# Patient Record
Sex: FEMALE | Race: BLACK OR AFRICAN AMERICAN | NOT HISPANIC OR LATINO | URBAN - METROPOLITAN AREA
[De-identification: names, ages, dates, MRNs, and addresses within clinical notes are randomized per-mention and may not be internally consistent; named-entity substitution may affect disease eponyms.]

---

## 2017-01-12 ENCOUNTER — TRANSFERRED RECORDS (OUTPATIENT)
Dept: HEALTH INFORMATION MANAGEMENT | Facility: CLINIC | Age: 32
End: 2017-01-12

## 2017-02-01 DIAGNOSIS — J45.40 MODERATE PERSISTENT ASTHMA WITHOUT COMPLICATION: Primary | ICD-10-CM

## 2017-02-14 DIAGNOSIS — I10 ESSENTIAL HYPERTENSION WITH GOAL BLOOD PRESSURE LESS THAN 140/90: ICD-10-CM

## 2017-02-14 RX ORDER — AMLODIPINE BESYLATE 10 MG/1
10 TABLET ORAL DAILY
Qty: 30 TABLET | Refills: 3 | Status: SHIPPED
Start: 2017-02-14 | End: 2017-03-20

## 2017-02-14 RX ORDER — LISINOPRIL 40 MG/1
40 TABLET ORAL DAILY
Qty: 30 TABLET | Refills: 3 | Status: SHIPPED
Start: 2017-02-14 | End: 2017-05-15

## 2017-02-15 DIAGNOSIS — I10 BENIGN ESSENTIAL HYPERTENSION: ICD-10-CM

## 2017-02-15 RX ORDER — CARVEDILOL 6.25 MG/1
6.25 TABLET ORAL 2 TIMES DAILY WITH MEALS
Qty: 60 TABLET | Refills: 3 | Status: SHIPPED
Start: 2017-02-15 | End: 2017-03-20

## 2017-02-17 ENCOUNTER — TRANSFERRED RECORDS (OUTPATIENT)
Dept: HEALTH INFORMATION MANAGEMENT | Facility: CLINIC | Age: 32
End: 2017-02-17

## 2017-02-22 ENCOUNTER — TELEPHONE (OUTPATIENT)
Dept: FAMILY MEDICINE | Facility: CLINIC | Age: 32
End: 2017-02-22

## 2017-02-23 ASSESSMENT — ASTHMA QUESTIONNAIRES: ACT_TOTALSCORE: 12

## 2017-03-17 ENCOUNTER — TRANSFERRED RECORDS (OUTPATIENT)
Dept: HEALTH INFORMATION MANAGEMENT | Facility: CLINIC | Age: 32
End: 2017-03-17

## 2017-03-17 ENCOUNTER — TELEPHONE (OUTPATIENT)
Dept: FAMILY MEDICINE | Facility: CLINIC | Age: 32
End: 2017-03-17

## 2017-03-17 ENCOUNTER — OFFICE VISIT (OUTPATIENT)
Dept: FAMILY MEDICINE | Facility: CLINIC | Age: 32
End: 2017-03-17

## 2017-03-17 VITALS
RESPIRATION RATE: 28 BRPM | WEIGHT: 153 LBS | TEMPERATURE: 98 F | OXYGEN SATURATION: 96 % | SYSTOLIC BLOOD PRESSURE: 205 MMHG | BODY MASS INDEX: 28.16 KG/M2 | HEIGHT: 62 IN | DIASTOLIC BLOOD PRESSURE: 131 MMHG | HEART RATE: 123 BPM

## 2017-03-17 DIAGNOSIS — I16.0 ASYMPTOMATIC HYPERTENSIVE URGENCY: ICD-10-CM

## 2017-03-17 DIAGNOSIS — R06.03 ACUTE RESPIRATORY DISTRESS: Primary | ICD-10-CM

## 2017-03-17 DIAGNOSIS — Z30.42 DEPO-PROVERA CONTRACEPTIVE STATUS: ICD-10-CM

## 2017-03-17 RX ORDER — ALBUTEROL SULFATE 90 UG/1
AEROSOL, METERED RESPIRATORY (INHALATION)
Qty: 1 INHALER | Refills: 5 | Status: CANCELLED | OUTPATIENT
Start: 2017-03-17

## 2017-03-17 RX ORDER — AMLODIPINE BESYLATE 10 MG/1
10 TABLET ORAL DAILY
Qty: 30 TABLET | Refills: 3 | Status: CANCELLED | OUTPATIENT
Start: 2017-03-17

## 2017-03-17 RX ORDER — ALBUTEROL SULFATE 0.83 MG/ML
1 SOLUTION RESPIRATORY (INHALATION) EVERY 6 HOURS PRN
Qty: 360 ML | Refills: 3 | Status: CANCELLED | OUTPATIENT
Start: 2017-03-17

## 2017-03-17 RX ORDER — LISINOPRIL 40 MG/1
40 TABLET ORAL DAILY
Qty: 30 TABLET | Refills: 3 | Status: CANCELLED | OUTPATIENT
Start: 2017-03-17

## 2017-03-17 NOTE — NURSING NOTE
The following nebulizer treatment was given:     MEDICATION: Duoneb  : [employer]  LOT #: [id number]  EXPIRATION DATE:  08/2018   NDC # 4736-1914-64    Clarisse Mena and Dr Nair

## 2017-03-17 NOTE — PROGRESS NOTES
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 Reports being seen in ER in [place] on 3/13, then hospitalized at [facility] for 2 nights for asthma. Careeverywhere reviewed -- appears patient was in ED 3/14 and hospitalized for 1 day when she was discharged due to needing to care for her children.    Reports ongoing shortness of breath for 1 month, off and on. Was worsening in past 1-2 weeks, to the point she couldn't breath with coughing and wheezing \"really bad\" prompting ED/hospital visit. Reports no improvement with hospital stay and since discharge. Currently, is on day 3 of medrol dose pack as well as using albuterol nebulizers and inhaler and dulera every 2-3 hours. Notes pain in left chest with breathing. Fevers off and on. Vomited once today, states she was able to keep down her meds after this now. Has HA every time she coughs. SOB at rest. Notes she can't sleep due to cough/shortness of breath -- requesting medicine to help with sleep. States that she was told her breathing was NOT due to asthma and was related to cerebrovascular disease.    Currently taking for medications (as prescribed on discharge):  -currently on day 3 of medrol dose pack  -oxycodone for pain in arthritis in back and herniated disk in back, prescribed through pain clinic  -ondansetron 4 mg but not taking as not taking as pills not ODT  -lisinopril 40 mg daily, kept down  -chantix 0.5 mg  -used albuterol nebulizer today -- 3 today, help for 10 - 15 minutes  -dulera 200 mcg/5mcg at least 6 times today --  from 2016 upon further review    Was on CoReg and amlodipine but not taking. Unfortunately, she continues to smoke.     Requesting Depo today.     Here with a friend today.         PMHX:     Patient Active Problem List   Diagnosis     Moyamoya disease     Tobacco use disorder     Migraine     Lumbago     Vitamin D " deficiency     History of angina pectoris     Health Care Home     Lump or mass in breast     Lesion of lower extremity     Possible secondary hypertension     Severe persistent asthma     Partial seizures (H)     Weakness of left side of body     Major depressive disorder, recurrent, severe without psychotic features (H)     Depo-Provera contraceptive status       Current Outpatient Prescriptions   Medication Sig Dispense Refill     lisinopril (PRINIVIL/ZESTRIL) 40 MG tablet Take 1 tablet (40 mg) by mouth daily 30 tablet 3     mometasone-formoterol (DULERA) 200-5 MCG/ACT oral inhaler Inhale 2 puffs into the lungs 2 times daily 13 Inhaler 11     medroxyPROGESTERone (DEPO-PROVERA) 150 MG/ML injection Inject 1 mL (150 mg) into the muscle every 3 months for 21 days 1 mL 0     albuterol (ALBUTEROL) 108 (90 BASE) MCG/ACT inhaler 1-2 puffs Every 4-6 hours as needed for shortness of breath. Max 12 puffs/ day. 1 Inhaler 5     oxyCODONE (ROXICODONE) 10 MG immediate release tablet Take 10 mg by mouth 4 times daily as needed for severe pain  30 tablet 0     albuterol (2.5 MG/3ML) 0.083% nebulizer solution Take 1 vial (2.5 mg) by nebulization every 6 hours as needed for shortness of breath / dyspnea or wheezing 360 mL 3       Family History   Problem Relation Age of Onset     DIABETES Mother      Hypertension Mother      Hypertension Father      Asthma Father      Coronary Artery Disease Maternal Grandmother      Colon Cancer Paternal Grandmother        Social History   Substance Use Topics     Smoking status: Current Every Day Smoker     Packs/day: 0.50     Types: Cigarettes     Smokeless tobacco: Never Used      Comment: About 12 cigarettes a day, but patient is trying to quit.      Alcohol use No       Allergies   Allergen Reactions     Ativan      Bee Venom      Eggs [Chicken-Derived Products (Egg)]      Pear      Unisom [Doxylamine Succinate]      Vicodin [Hydrocodone-Acetaminophen] Itching     Wellbutrin [Bupropion  "Hydrobromide]      No reaction known.  Seizure history, do not use.            Physical Exam:     Vitals:    17 1605   BP: (!) 205/131   Pulse: 123   Resp: 28   Temp: 98  F (36.7  C)   TempSrc: Oral   SpO2: 96%   Weight: 153 lb (69.4 kg)   Height: 5' 1.75\" (156.8 cm)     Body mass index is 28.21 kg/(m^2).    GENERAL APPEARANCE: moderate respiratory distress at rest, able to speak in short 5-6 word sentences  EYES: Eyes grossly normal to inspection,  PERRL  HENT: ear canals and TM's normal and nose and mouth without ulcers or lesions  NECK: no adenopathy, no asymmetry, masses, or scars and thyroid normal to palpation  RESP: lungs with diffuse tight wheezing, limited air movement throughout, tachypnea, no focal consolidation  CV: regular and tachycardic  MS: extremities normal- no gross deformities noted  SKIN: no suspicious lesions or rashes    Assessment and Plan   1. Acute respiratory distress (H)  Duoneb given in clinic with symptomatic improvement and better air movement though continued tight wheezing and tachypnea. Patient has failed outpatient treatment as 2 days of steriods, nebulizers without improvement. Has been using Dulera incorrectly and suspect she has not been receiving any medication based on our review of her inhaler in clinic today (also,  over 1 year ago). I feel that she needs further ER evaluation for her shortness of breath with likely admission for IV steroids and continuous/frequent nebulizers. Patient desires to be seen at [facility], thus was sent there with friend (no acute need for EMS transfer, stable symptoms over the past week). ER alerted to patients pending arrival.   - Nebulizer/Inhalation Treatment, initial    2. Asymptomatic hypertensive urgency  Seemingly asymptomatic though is reporting HA (with cough) and vomiting. Reports these numbers are not unlike her \"usual\". Is only on lisinopril 40 mg daily now (out of Coreg, amlodpine). Further evaluation/treatment " in ED.    3. Depo-provera contraceptive status  Due by 3/23. Will NOT administer today with uncontrolled HTN. If continued to be uncontrolled, may need to consider alternative option.     Unfortunately, patient tends to be seen in various EDs/hospitals which certainly complicates her already complex care for reasons that are not entirely clear. Advised to stick to single system for best care.     Options for treatment and follow-up care were reviewed with the patient and/or guardian. Jennifer Barraza and/or guardian engaged in the decision making process and verbalized understanding of the options discussed and agreed with the final plan.    Teresa Mena MD  [facility] Medicine Resident  Pager# [phone]    Precepted with: Ashanti Nair MD

## 2017-03-17 NOTE — MR AVS SNAPSHOT
After Visit Summary   3/17/2017    Jennifer Barraza    MRN: [id number]           Patient Information     Date Of Birth          1985        Visit Information        Provider Department      3/17/2017 4:00 PM Teresa Mena MD [facility]        Today's Diagnoses     SOB (shortness of breath)    -  1    Benign essential hypertension        Essential hypertension with goal blood pressure less than 140/90        Moderate persistent asthma without complication        Tobacco abuse           Follow-ups after your visit        Who to contact     Please call your clinic at [phone] to:    Ask questions about your health    Make or cancel appointments    Discuss your medicines    Learn about your test results    Speak to your doctor   If you have compliments or concerns about an experience at your clinic, or if you wish to file a complaint, please contact [facility] Patient Relations at [phone] or email us at [email or web address]         Additional Information About Your Visit        MyChart Information     GotaCopyt is an electronic gateway that provides easy, online access to your medical records. With T-VIPS, you can request a clinic appointment, read your test results, renew a prescription or communicate with your care team.     To sign up for GotaCopyt visit the website at [email or web address]   You will be asked to enter the access code listed below, as well as some personal information. Please follow the directions to create your username and password.     Your access code is: [email or web address]  Expires: 6/15/2017  4:10 PM     Your access code will  in 90 days. If you need help or a new code, please contact your [facility] or call [phone] for assistance.        Care EveryWhere ID     This is your Care EveryWhere ID. This could be used by other organizations to access your [facility]  "records  [id number]        Your Vitals Were     Pulse Temperature Respirations Height Pulse Oximetry BMI (Body Mass Index)    123 98  F (36.7  C) (Oral) 28 5' 1.75\" (156.8 cm) 96% 28.21 kg/m2       Blood Pressure from Last 3 Encounters:   03/17/17 (!) 205/131   12/21/16 (!) 173/106   11/15/16 (!) 195/115    Weight from Last 3 Encounters:   03/17/17 153 lb (69.4 kg)   12/21/16 152 lb 6.4 oz (69.1 kg)   11/15/16 153 lb 12.8 oz (69.8 kg)              Today, you had the following     No orders found for display       Primary Care Provider Office Phone # Fax #    Navi Hall [phone]       [facility] [address] [place] [address]        Thank you!     Thank you for choosing [facility]  for your care. Our goal is always to provide you with excellent care. Hearing back from our patients is one way we can continue to improve our services. Please take a few minutes to complete the written survey that you may receive in the mail after your visit with us. Thank you!             Your Updated Medication List - Protect others around you: Learn how to safely use, store and throw away your medicines at www.disposemymeds.org.          This list is accurate as of: 3/17/17  5:02 PM.  Always use your most recent med list.                   Brand Name Dispense Instructions for use    acetaminophen 325 MG tablet    TYLENOL    30 tablet    Take 2 tablets (650 mg) by mouth every 4 hours as needed for mild pain       * albuterol (2.5 MG/3ML) 0.083% neb solution     360 mL    Take 1 vial (2.5 mg) by nebulization every 6 hours as needed for shortness of breath / dyspnea or wheezing       * albuterol 108 (90 BASE) MCG/ACT Inhaler    albuterol    1 Inhaler    1-2 puffs Every 4-6 hours as needed for shortness of breath. Max 12 puffs/ day.       amLODIPine 10 MG tablet    NORVASC    30 tablet    Take 1 tablet (10 mg) by mouth daily       aspirin 325 MG tablet     90 tablet    " Take 1 tablet (325 mg) by mouth daily       atorvastatin 40 MG tablet    LIPITOR    90 tablet    Take 1 tablet (40 mg) by mouth daily       carvedilol 6.25 MG tablet    COREG    60 tablet    Take 1 tablet (6.25 mg) by mouth 2 times daily (with meals)       cholecalciferol 1000 UNIT tablet    vitamin D    100 tablet    Take 1 tablet (1,000 Units) by mouth daily       lisinopril 40 MG tablet    PRINIVIL/ZESTRIL    30 tablet    Take 1 tablet (40 mg) by mouth daily       loratadine 10 MG tablet    CLARITIN     Take 10 mg by mouth once a day as needed for Allergic Rhinitis.       medroxyPROGESTERone 150 MG/ML injection    DEPO-PROVERA    1 mL    Inject 1 mL (150 mg) into the muscle every 3 months for 21 days       mometasone-formoterol 200-5 MCG/ACT oral inhaler    DULERA    13 Inhaler    Inhale 2 puffs into the lungs 2 times daily       oxyCODONE 10 MG IR tablet    ROXICODONE    30 tablet    Take 10 mg by mouth 4 times daily as needed for severe pain       predniSONE 10 MG tablet    DELTASONE    30 tablet    4 tabs daily for 3 days, then 3 tabs daily for 3 days, then 2 tabs daily for 3 days, then 1 tab daily for 3 days, then stop       PRILOSEC PO      Take 20 mg by mouth daily as needed       varenicline 0.5 MG X 11 & 1 MG X 42 tablet    CHANTIX STARTING MONTH BREANNE    53 tablet    Take 0.5 mg tab daily for 3 days, then 0.5 mg tab twice daily for 4 days, then 1 mg twice daily.       * Notice:  This list has 2 medication(s) that are the same as other medications prescribed for you. Read the directions carefully, and ask your doctor or other care provider to review them with you.

## 2017-03-17 NOTE — TELEPHONE ENCOUNTER
I got the pt ED discharge paperwork, I called to check up on the pt and help setup a ED follow up.  Pt stated that she is not feeling well, she was in the hospital for two days, and was just discharged.  Pt stated that she was admitted to [facility] on the 03/15/17, and was discharged on 03/15/17.  I told the pt that when she has gone to the hospital, we like for her to follow up with her doctor.  The pt stated that she would like to follow up with her doctor.  The pt was transferred to the  to schedule.      The pt was scheduled to come in today at 3:40pm to see .

## 2017-03-20 ENCOUNTER — OFFICE VISIT (OUTPATIENT)
Dept: FAMILY MEDICINE | Facility: CLINIC | Age: 32
End: 2017-03-20

## 2017-03-20 ENCOUNTER — TELEPHONE (OUTPATIENT)
Dept: FAMILY MEDICINE | Facility: CLINIC | Age: 32
End: 2017-03-20

## 2017-03-20 ENCOUNTER — OFFICE VISIT (OUTPATIENT)
Dept: PHARMACY | Facility: CLINIC | Age: 32
End: 2017-03-20

## 2017-03-20 VITALS
DIASTOLIC BLOOD PRESSURE: 98 MMHG | TEMPERATURE: 98.3 F | SYSTOLIC BLOOD PRESSURE: 176 MMHG | HEART RATE: 111 BPM | RESPIRATION RATE: 20 BRPM | WEIGHT: 153.6 LBS | BODY MASS INDEX: 28.32 KG/M2 | OXYGEN SATURATION: 94 %

## 2017-03-20 DIAGNOSIS — M54.5 CHRONIC MIDLINE LOW BACK PAIN, WITH SCIATICA PRESENCE UNSPECIFIED: ICD-10-CM

## 2017-03-20 DIAGNOSIS — Z76.0 ENCOUNTER FOR MEDICATION REFILL: ICD-10-CM

## 2017-03-20 DIAGNOSIS — Z30.42 DEPO-PROVERA CONTRACEPTIVE STATUS: ICD-10-CM

## 2017-03-20 DIAGNOSIS — F17.200 TOBACCO USE DISORDER: ICD-10-CM

## 2017-03-20 DIAGNOSIS — M79.18 MYOFASCIAL PAIN: ICD-10-CM

## 2017-03-20 DIAGNOSIS — J45.51 SEVERE PERSISTENT ASTHMA WITH ACUTE EXACERBATION (H): Primary | ICD-10-CM

## 2017-03-20 DIAGNOSIS — F33.2 MAJOR DEPRESSIVE DISORDER, RECURRENT, SEVERE WITHOUT PSYCHOTIC FEATURES (H): ICD-10-CM

## 2017-03-20 DIAGNOSIS — Z30.42 ENCOUNTER FOR SURVEILLANCE OF INJECTABLE CONTRACEPTIVE: ICD-10-CM

## 2017-03-20 DIAGNOSIS — Z71.89 ENCOUNTER FOR MEDICATION REVIEW AND COUNSELING: Primary | ICD-10-CM

## 2017-03-20 DIAGNOSIS — G89.29 CHRONIC MIDLINE LOW BACK PAIN, WITH SCIATICA PRESENCE UNSPECIFIED: ICD-10-CM

## 2017-03-20 DIAGNOSIS — J45.51 SEVERE PERSISTENT ASTHMA WITH ACUTE EXACERBATION (H): ICD-10-CM

## 2017-03-20 DIAGNOSIS — I15.9 SECONDARY HYPERTENSION: ICD-10-CM

## 2017-03-20 DIAGNOSIS — G89.4 CHRONIC PAIN SYNDROME: ICD-10-CM

## 2017-03-20 RX ORDER — PREDNISONE 20 MG/1
40 TABLET ORAL DAILY
Qty: 4 TABLET | Refills: 0 | Status: SHIPPED | OUTPATIENT
Start: 2017-03-20 | End: 2017-03-22

## 2017-03-20 RX ORDER — ALBUTEROL SULFATE 90 UG/1
2 AEROSOL, METERED RESPIRATORY (INHALATION) EVERY 4 HOURS PRN
COMMUNITY
Start: 2017-03-19 | End: 2017-03-20

## 2017-03-20 RX ORDER — OXYCODONE HYDROCHLORIDE 10 MG/1
TABLET ORAL
COMMUNITY
Start: 2017-03-20 | End: 2018-04-11

## 2017-03-20 RX ORDER — VARENICLINE TARTRATE 0.5 MG/1
TABLET, FILM COATED ORAL
COMMUNITY
Start: 2017-03-20 | End: 2017-05-15

## 2017-03-20 RX ORDER — IPRATROPIUM BROMIDE AND ALBUTEROL SULFATE 2.5; .5 MG/3ML; MG/3ML
3 SOLUTION RESPIRATORY (INHALATION) EVERY 6 HOURS PRN
COMMUNITY
End: 2017-03-20

## 2017-03-20 RX ORDER — AMLODIPINE BESYLATE 10 MG/1
10 TABLET ORAL DAILY
COMMUNITY
Start: 2017-03-19 | End: 2017-08-14

## 2017-03-20 RX ORDER — PREDNISONE 20 MG/1
40 TABLET ORAL DAILY
COMMUNITY
Start: 2017-03-20 | End: 2017-03-20

## 2017-03-20 RX ORDER — LORATADINE 10 MG/1
10 TABLET ORAL DAILY
Qty: 30 TABLET | Refills: 3 | Status: SHIPPED | OUTPATIENT
Start: 2017-03-20 | End: 2018-02-12

## 2017-03-20 RX ORDER — LORATADINE 10 MG/1
10 TABLET ORAL DAILY
COMMUNITY
Start: 2017-03-19 | End: 2017-03-20

## 2017-03-20 RX ORDER — IPRATROPIUM BROMIDE AND ALBUTEROL SULFATE 2.5; .5 MG/3ML; MG/3ML
3 SOLUTION RESPIRATORY (INHALATION) EVERY 6 HOURS PRN
Qty: 360 ML | Refills: 0 | Status: SHIPPED | OUTPATIENT
Start: 2017-03-20 | End: 2017-08-24

## 2017-03-20 RX ORDER — ASPIRIN 325 MG
325 TABLET ORAL DAILY
COMMUNITY
Start: 2015-05-10 | End: 2017-03-20

## 2017-03-20 RX ORDER — KETOROLAC TROMETHAMINE 30 MG/ML
30 INJECTION, SOLUTION INTRAMUSCULAR; INTRAVENOUS ONCE
Qty: 1 ML | Refills: 0 | OUTPATIENT
Start: 2017-03-20 | End: 2017-03-20

## 2017-03-20 RX ORDER — ASPIRIN 325 MG
325 TABLET ORAL DAILY
Qty: 180 TABLET | Refills: 0 | Status: SHIPPED | OUTPATIENT
Start: 2017-03-20 | End: 2018-04-05

## 2017-03-20 RX ORDER — VARENICLINE TARTRATE 0.5 MG/1
0.5 TABLET, FILM COATED ORAL 2 TIMES DAILY
COMMUNITY
End: 2017-03-20

## 2017-03-20 NOTE — MR AVS SNAPSHOT
After Visit Summary   3/20/2017    Jennifer Barraza    MRN: [id number]           Patient Information     Date Of Birth          1985        Visit Information        Provider Department      3/20/2017 2:00 PM Magali Bal, RPH [facility]        Today's Diagnoses     Encounter for medication review and counseling    -  1    Severe persistent asthma with acute exacerbation        Tobacco use disorder        Possible secondary hypertension        Chronic pain syndrome           Follow-ups after your visit        Who to contact     Please call your clinic at [phone] to:    Ask questions about your health    Make or cancel appointments    Discuss your medicines    Learn about your test results    Speak to your doctor   If you have compliments or concerns about an experience at your clinic, or if you wish to file a complaint, please contact [facility] Patient Relations at [phone] or email us at [email or web address]         Additional Information About Your Visit        MyChart Information     FirstJobt is an electronic gateway that provides easy, online access to your medical records. With [employer], you can request a clinic appointment, read your test results, renew a prescription or communicate with your care team.     To sign up for FirstJobt visit the website at [email or web address]   You will be asked to enter the access code listed below, as well as some personal information. Please follow the directions to create your username and password.     Your access code is: [email or web address]  Expires: 6/15/2017  4:10 PM     Your access code will  in 90 days. If you need help or a new code, please contact your [facility] or call [phone] for assistance.        Care EveryWhere ID     This is your Care EveryWhere ID. This could be used by other organizations to access your [facility]  records  [id number]         Blood Pressure from Last 3 Encounters:   03/20/17 (!) 176/98   03/17/17 (!) 205/131   12/21/16 (!) 173/106    Weight from Last 3 Encounters:   03/20/17 153 lb 9.6 oz (69.7 kg)   03/17/17 153 lb (69.4 kg)   12/21/16 152 lb 6.4 oz (69.1 kg)              Today, you had the following     No orders found for display         Today's Medication Changes          These changes are accurate as of: 3/20/17 11:59 PM.  If you have any questions, ask your nurse or doctor.               Start taking these medicines.        Dose/Directions    ketorolac 30 MG/ML injection   Commonly known as:  TORADOL   Used for:  Chronic midline low back pain, with sciatica presence unspecified   Started by:  Demetrice Castillo APRN CNP        Dose:  30 mg   Inject 1 mL (30 mg) into the muscle once for 1 dose   Quantity:  1 mL   Refills:  0       omeprazole 20 MG CR capsule   Commonly known as:  priLOSEC   Used for:  Severe persistent asthma with acute exacerbation   Started by:  Demetrice Castillo APRN CNP        Dose:  20 mg   Take 1 capsule (20 mg) by mouth daily as needed   Quantity:  30 capsule   Refills:  3         These medicines have changed or have updated prescriptions.        Dose/Directions    oxyCODONE 10 MG IR tablet   Commonly known as:  ROXICODONE   This may have changed:    - how much to take  - how to take this  - when to take this  - reasons to take this  - additional instructions   Used for:  Myofascial pain   Changed by:  Demetrice Castillo APRN CNP        Prescribed by [facility]. Takes 1 tablet by mouth 4-6 times per day.   Refills:  0       varenicline 0.5 MG tablet   Commonly known as:  CHANTIX   This may have changed:    - how much to take  - how to take this  - when to take this  - additional instructions   Changed by:  Demetrice Castillo APRN CNP        Days 1 to 3: 0.5 mg once daily, Days 4 to 7: 0.5 mg twice daily, Thereafter use: 1 mg twice daily   Refills:  0             Where to get your medicines      These medications were sent to [facility] [id number] [address] [address]     Phone:  [phone]     aspirin 325 MG tablet    ipratropium - albuterol 0.5 mg/2.5 mg/3 mL 0.5-2.5 (3) MG/3ML neb solution    loratadine 10 MG tablet    omeprazole 20 MG CR capsule    predniSONE 20 MG tablet         Some of these will need a paper prescription and others can be bought over the counter.  Ask your nurse if you have questions.     You don't need a prescription for these medications     ketorolac 30 MG/ML injection                Primary Care Provider Office Phone # Fax #    Navi Hall [phone]       [facility] [address] [place] [address]        Thank you!     Thank you for choosing [facility]  for your care. Our goal is always to provide you with excellent care. Hearing back from our patients is one way we can continue to improve our services. Please take a few minutes to complete the written survey that you may receive in the mail after your visit with us. Thank you!             Your Updated Medication List - Protect others around you: Learn how to safely use, store and throw away your medicines at www.disposemymeds.org.          This list is accurate as of: 3/20/17 11:59 PM.  Always use your most recent med list.                   Brand Name Dispense Instructions for use    albuterol 108 (90 BASE) MCG/ACT Inhaler    albuterol    1 Inhaler    1-2 puffs Every 4-6 hours as needed for shortness of breath. Max 12 puffs/ day.       amLODIPine 10 MG tablet    NORVASC     Take 10 mg by mouth daily       aspirin 325 MG tablet     180 tablet    Take 1 tablet (325 mg) by mouth daily       ipratropium - albuterol 0.5 mg/2.5 mg/3 mL 0.5-2.5 (3) MG/3ML neb solution    DUONEB    360 mL    Take 1 vial (3 mLs) by nebulization every 6  hours as needed       ketorolac 30 MG/ML injection    TORADOL    1 mL    Inject 1 mL (30 mg) into the muscle once for 1 dose       lisinopril 40 MG tablet    PRINIVIL/ZESTRIL    30 tablet    Take 1 tablet (40 mg) by mouth daily       loratadine 10 MG tablet    CLARITIN    30 tablet    Take 1 tablet (10 mg) by mouth daily       medroxyPROGESTERone 150 MG/ML injection    DEPO-PROVERA    1 mL    Inject 1 mL (150 mg) into the muscle every 3 months for 21 days       mometasone-formoterol 200-5 MCG/ACT oral inhaler    DULERA    13 Inhaler    Inhale 2 puffs into the lungs 2 times daily       omeprazole 20 MG CR capsule    priLOSEC    30 capsule    Take 1 capsule (20 mg) by mouth daily as needed       oxyCODONE 10 MG IR tablet    ROXICODONE     Prescribed by [facility]. Takes 1 tablet by mouth 4-6 times per day.       predniSONE 20 MG tablet    DELTASONE    4 tablet    Take 2 tablets (40 mg) by mouth daily for 2 days       varenicline 0.5 MG tablet    CHANTIX     Days 1 to 3: 0.5 mg once daily, Days 4 to 7: 0.5 mg twice daily, Thereafter use: 1 mg twice daily

## 2017-03-20 NOTE — TELEPHONE ENCOUNTER
TCM APPOINTMENT FOLLOW-UP PHONE CALL    Medication concerns: none  Referral/other appointment concerns: Still not feeling well, was in the hospital, came here Friday for a Tcm appt and then was sent to the hospital this time went to [facility] on Friday and discharged Sunday 3.19  New/additonal concerns since last visit: Still does not feel better short of breath.  Was able to converse on the phone without sounding distressed  Next appointment:3.20.17 2pm pharm D and 2:20 with Demetrice DEAN    Comments:NA

## 2017-03-20 NOTE — PROGRESS NOTES
SUBJECTIVE:HOSPITAL FOLLOW-UP: The patient is a 31 year old presents today for follow-up of recent hospitalization at [facility] on 3/17-3/19.  The patient was hospitalized with an asthma exacerbation. There was low suspicion for influenza or pneumonia.. The patient was treated with prednisone and asked to follow up today. At this time the patient denies improvement of the original symptoms, that is cough and dyspnea have not improved. Patient follows up /[facility] for chronic pain of her back, for which she takes oxycodone.The patient reports the following new symptoms-worsening back pain. Additionally she has a cough that is keeping her awake at night. Patient was treated with prednisone from 3/17-3/19 during her hospital stay, and and is about to  the remainder of the course of oral prednisone from her pharmacy, to take to complete her acute asthma treatment.  Jennifer presented with a male  to follow up 1 day after discharge from [facility] for asthma exacerbation. Major concerns today include increased lower back pain, and ongoing SOB and cough, with difficulty sleeping despite recent hospitalizations x 2 in the past 3 weeks. Meeting with Magali Saleem, PharmD, re: medication management post-hospitalization.    PMH:   Perisistent asthma, severe  Low back pain  Partial seizures  Left sided weakness  Moyamoya disease  Hypertension  Major depression  Depo-provera contraception              Current medications:  Amlodipine 10 mg po daily  Oxycodone 10 mg IR tablet- 1 tablet 4-6 x per day.  Chantix 0.5 mg tablets-ordered, not currently taking  Duoneb 3 ML 1 vial PRN q 6 hours by nebulizer  ASA 325mg 1 po daily  Omeprazole 20 mg po daily PRN  Loratadine 10 mg po daily  Lisinopril 40 mg po daily  Mometasone-formoterol 2 puffs in lungs BID  Albuterol 108 mcg inhaler 1-2 puffs  q 1-2 hours PRN SOB.  Medoxyprogesterone 150 mg.ml injection into the muscle q 3  "months.    OBJECTIVE: BP (!) 176/98  Pulse 111  Temp 98.3  F (36.8  C) (Oral)  Resp 20  Wt 153 lb 9.6 oz (69.7 kg)  SpO2 94%  BMI 28.32 kg/m2  Constitutional: alert, no distress and moderate distress   Cardiovascular: declined offer of assessment  Respiratory: positive findings: tachypnea, rate 24-28 per minute, wheezing/ faint audible > expiration, no frequent cough noted during the encounter  Psychiatric: mentation appears normal, affect flat, anxious and fatiguedHead: Grossly normocephalic  Neck: Declined exam  ENT: Declined exam  NEURO:Lethargic and fatigued appearing   SKIN: no obvious suspicious lesions or rashes  JOINT/EXTREMITIES: Sitting up right in chair for assessment, dod not observe gait and patient declined/refused exam at present      ASSESSMENT: (J45.51) Severe persistent asthma with acute exacerbation-(primary encounter diagnosis)  Comment:Tachypnea, audible wheeze and reported cough increased at night, highly concerning.    Plan:Continue outpatient oral steroid burst for the next 1-2 days.          Reviewed controller and rescue medications, and appropriate use of each.          Urged to take Duoneb treatment in clinic to address tachypnea and wheezing, and cough at least for this evening.   Patient declined neb treatment, stating \"she can take her neb treatment at home.\"          Discussed options for cough treatment, as guiafenesin not effective.Recomend completion of steroid burst which is to be picked up at pharmacy this afternoon, with albuterol  By MDI or neb q 2-4 hours as needed,  and conotroller medicine Dulera 200-5 2 puffs into the lungs 2 x daily    M54.5,  G89.29) Chronic midline low back pain, with sciatica presence unspecified    Comment: Concerned about patient c/o increased back pain, lower back on admit to exam room.   Plan: ketorolac (TORADOL) 30 MG/ML injection        Offered and accepted an injection of IM NSAID to treat increased back pain.  Continue back pain and FU by [facility] " "Clinic, with oxycodone IR 10 mg 1 tablet 4-6x daily as needed for pain.   (115.9) Secondary HTN-elevated B/P in setting of pain, and continued cough/exacerbation of asthma still being treated w/steroid burst post hospitalization.   Unfortunately, continues to smoke. Urged cessation to prevent vasoconstriction, with contribution to  HTN. Continue lisinopril, pain medications and symptom management (see ketorolac and oxycodone, treatment w/oral prednisone and asthma medications)  B/P will need to be rechecked in the next 2 days to assess response to these measures. Advised patient she can RTC, or monitor at home or pharmacy or fire station in her community. Contact clinic to report if B/P remains >140/90, or promptly with chest pain or SOB or development orf rapid or irregular heart rate, or other symptoms  (M79.1) Myofascial pain  Comment: Chronic pain plan and agreement w/[facility].  Plan: oxyCODONE (ROXICODONE) 10 MG IR tablet        Takes 1 tablet 4-6x daily as needed for chronic pain.  Will not give cough medicine w/codeine due to conflict with opioid pain medications with potential for respiratory depression, LRTI,  And violation of pain medicine agreement patient has with pain specialty care.    (F17.200) Tobacco use disorder  Comment: Continues to smoke per recent record. Patient and  admit she is smoking and is also exposed to secondhand smoke on a regular basis.  Plan: Urged to DC smoking, and urged to avoid secondhand smoke, particularly during this exacerbation but more appropriately, for the future as well          Refuses heart and lung auscultation today, prefers to discontinue this visit, and not see NP in future due to \"an attitude that is not working.\"   F33.2) Major depressive disorder, recurrent, severe without psychotic features (H)  Comment: No assessment available at this time as patient ended visit prior to assessment of mood (PHQ-9/ELBA-7.)  Depo Provera contraceptive " "status:  Will give DMPA 150 mg as it is due at this time, will be overdue as of 3/30/17.  Counseled patientshe may have to report to the ED again if her condition worsens as evening approaches, hence urged Jennifer to engage with NP to treat and monitor the exacerbation, and prevent need for ED visit and ncreased likelihood of re-hospitalization.   \"I know how to go to the hospital, I always do.\"    RTC in 3 days to see PCP, or sooner as needed, at any time for concerns related to the above health and comfort needs, or for any other concerns relating to her health.    30 minutes was spent on this visit, >50% counseling and coordination of care re: tachypnea, wheeze, cough, asthma and post-discharge medications and follow up    Demetrice Castillo    "

## 2017-03-20 NOTE — PATIENT INSTRUCTIONS
Think about strategies to help ensure you take your medicines every day. Examples we talked about in clinic: pillboxes, cell phone reminders.     Continue to use the Dulera inhaler twice per day, everyday. Even when your breathing is good. This medicine helps to prevent asthma flares.     Start the Chantix (varenicline) 1 week before your targeted quit date.   ~~~~~~~~~~~~~~~~~~~~~~~        ~~~~~~~~~~~~~~~~~~~~~~  Your medication list is printed, please keep this with you, it is helpful to bring this current list to any other medical appointments, the emergency room or hospital.    If you had lab testing today and your results are reassuring or normal they will be be mailed to you within 7 days.     If the lab tests need quick action we will call you with the results.   The phone number we will call with results is # [phone] (home) NONE (work). If this is not the best number please call our clinic and change the number.    If you need any refills please call your pharmacy and they will contact us.    If you have any further concerns or wish to schedule another appointment you must call our office during normal business hours  [phone] (8-5:00 M-F)  If you have urgent medical questions that cannot wait  you may also call [phone] at any time of day.  If you have a medical emergency please call [phone].    Thank you for coming to [facility].

## 2017-03-20 NOTE — MR AVS SNAPSHOT
After Visit Summary   3/20/2017    Jennifer Barraza    MRN: [id number]           Patient Information     Date Of Birth          1985        Visit Information        Provider Department      3/20/2017 2:20 PM Demetrice Castillo APRN CNP [facility]        Today's Diagnoses     Severe persistent asthma with acute exacerbation    -  1    Chronic midline low back pain, with sciatica presence unspecified        Encounter for medication refill        Secondary hypertension        Myofascial pain        Tobacco use disorder        Major depressive disorder, recurrent, severe without psychotic features (H)        Depo-Provera contraceptive status          Care Instructions    Think about strategies to help ensure you take your medicines every day. Examples we talked about in clinic: pillboxes, cell phone reminders.     Continue to use the Dulera inhaler twice per day, everyday. Even when your breathing is good. This medicine helps to prevent asthma flares.     Start the Chantix (varenicline) 1 week before your targeted quit date.   ~~~~~~~~~~~~~~~~~~~~~~~        ~~~~~~~~~~~~~~~~~~~~~~  Your medication list is printed, please keep this with you, it is helpful to bring this current list to any other medical appointments, the emergency room or hospital.    If you had lab testing today and your results are reassuring or normal they will be be mailed to you within 7 days.     If the lab tests need quick action we will call you with the results.   The phone number we will call with results is # [phone] (home) NONE (work). If this is not the best number please call our clinic and change the number.    If you need any refills please call your pharmacy and they will contact us.    If you have any further concerns or wish to schedule another appointment you must call our office during normal business hours  [phone] (8-5:00 M-F)  If you have urgent medical questions that cannot wait  you  may also call [phone] at any time of day.  If you have a medical emergency please call 911.    Thank you for coming to [facility].          Follow-ups after your visit        Follow-up notes from your care team     Return in about 3 years (around 3/20/2020), or at any time for persisting or worsening symptoms worsen or fail to improve, for BP Recheck,  Complete Physical Exam,post discharge,  Lab Work.      Who to contact     Please call your clinic at [phone] to:    Ask questions about your health    Make or cancel appointments    Discuss your medicines    Learn about your test results    Speak to your doctor   If you have compliments or concerns about an experience at your clinic, or if you wish to file a complaint, please contact [facility] Patient Relations at [phone] or email us at [email or web address]         Additional Information About Your Visit        [employer] Information     [employer] is an electronic gateway that provides easy, online access to your medical records. With [employer], you can request a clinic appointment, read your test results, renew a prescription or communicate with your care team.     To sign up for [employer] visit the website at [email or web address]   You will be asked to enter the access code listed below, as well as some personal information. Please follow the directions to create your username and password.     Your access code is: [email or web address]  Expires: 6/15/2017  4:10 PM     Your access code will  in 90 days. If you need help or a new code, please contact your [facility] or call [phone] for assistance.        Care EveryWhere ID     This is your Care EveryWhere ID. This could be used by other organizations to access your [facility] medical records  [id number]        Your Vitals Were     Pulse Temperature Respirations Pulse Oximetry BMI (Body Mass Index)       111 98.3  F (36.8   C) (Oral) 20 94% 28.32 kg/m2        Blood Pressure from Last 3 Encounters:   03/20/17 (!) 176/98   03/17/17 (!) 205/131   12/21/16 (!) 173/106    Weight from Last 3 Encounters:   03/20/17 153 lb 9.6 oz (69.7 kg)   03/17/17 153 lb (69.4 kg)   12/21/16 152 lb 6.4 oz (69.1 kg)              Today, you had the following     No orders found for display         Today's Medication Changes          These changes are accurate as of: 3/20/17 11:59 PM.  If you have any questions, ask your nurse or doctor.               Start taking these medicines.        Dose/Directions    ketorolac 30 MG/ML injection   Commonly known as:  TORADOL   Used for:  Chronic midline low back pain, with sciatica presence unspecified   Started by:  Demetrice Castillo APRN CNP        Dose:  30 mg   Inject 1 mL (30 mg) into the muscle once for 1 dose   Quantity:  1 mL   Refills:  0       omeprazole 20 MG CR capsule   Commonly known as:  priLOSEC   Used for:  Severe persistent asthma with acute exacerbation   Started by:  Demetrice Castillo APRN CNP        Dose:  20 mg   Take 1 capsule (20 mg) by mouth daily as needed   Quantity:  30 capsule   Refills:  3         These medicines have changed or have updated prescriptions.        Dose/Directions    oxyCODONE 10 MG IR tablet   Commonly known as:  ROXICODONE   This may have changed:    - how much to take  - how to take this  - when to take this  - reasons to take this  - additional instructions   Used for:  Myofascial pain   Changed by:  Demetrice Castillo APRN CNP        Prescribed by [facility]. Takes 1 tablet by mouth 4-6 times per day.   Refills:  0       varenicline 0.5 MG tablet   Commonly known as:  CHANTIX   This may have changed:    - how much to take  - how to take this  - when to take this  - additional instructions   Changed by:  Demetrice Castillo APRN CNP        Days 1 to 3: 0.5 mg once daily, Days 4 to 7: 0.5 mg twice daily, Thereafter use: 1 mg twice daily   Refills:   0            Where to get your medicines      These medications were sent to [facility] [address] [address] AT [address] & [address] [address]     Phone:  [phone]     aspirin 325 MG tablet    ipratropium - albuterol 0.5 mg/2.5 mg/3 mL 0.5-2.5 (3) MG/3ML neb solution    loratadine 10 MG tablet    omeprazole 20 MG CR capsule    predniSONE 20 MG tablet         Some of these will need a paper prescription and others can be bought over the counter.  Ask your nurse if you have questions.     You don't need a prescription for these medications     ketorolac 30 MG/ML injection    medroxyPROGESTERone 150 MG/ML injection                Primary Care Provider Office Phone # Fax #    Navi Hall [phone]       [facility] [address] [place] [address]        Thank you!     Thank you for choosing [facility]  for your care. Our goal is always to provide you with excellent care. Hearing back from our patients is one way we can continue to improve our services. Please take a few minutes to complete the written survey that you may receive in the mail after your visit with us. Thank you!             Your Updated Medication List - Protect others around you: Learn how to safely use, store and throw away your medicines at www.disposemymeds.org.          This list is accurate as of: 3/20/17 11:59 PM.  Always use your most recent med list.                   Brand Name Dispense Instructions for use    albuterol 108 (90 BASE) MCG/ACT Inhaler    albuterol    1 Inhaler    1-2 puffs Every 4-6 hours as needed for shortness of breath. Max 12 puffs/ day.       amLODIPine 10 MG tablet    NORVASC     Take 10 mg by mouth daily       aspirin 325 MG tablet     180 tablet    Take 1 tablet (325 mg) by mouth daily       ipratropium - albuterol 0.5 mg/2.5 mg/3 mL 0.5-2.5 (3) MG/3ML neb solution    DUONEB    360  mL    Take 1 vial (3 mLs) by nebulization every 6 hours as needed       ketorolac 30 MG/ML injection    TORADOL    1 mL    Inject 1 mL (30 mg) into the muscle once for 1 dose       lisinopril 40 MG tablet    PRINIVIL/ZESTRIL    30 tablet    Take 1 tablet (40 mg) by mouth daily       loratadine 10 MG tablet    CLARITIN    30 tablet    Take 1 tablet (10 mg) by mouth daily       medroxyPROGESTERone 150 MG/ML injection   Start taking on:  6/5/2017    DEPO-PROVERA    1 mL    Inject 1 mL (150 mg) into the muscle every 3 months       mometasone-formoterol 200-5 MCG/ACT oral inhaler    DULERA    13 Inhaler    Inhale 2 puffs into the lungs 2 times daily       omeprazole 20 MG CR capsule    priLOSEC    30 capsule    Take 1 capsule (20 mg) by mouth daily as needed       oxyCODONE 10 MG IR tablet    ROXICODONE     Prescribed by [facility]. Takes 1 tablet by mouth 4-6 times per day.       predniSONE 20 MG tablet    DELTASONE    4 tablet    Take 2 tablets (40 mg) by mouth daily for 2 days       varenicline 0.5 MG tablet    CHANTIX     Days 1 to 3: 0.5 mg once daily, Days 4 to 7: 0.5 mg twice daily, Thereafter use: 1 mg twice daily

## 2017-03-22 NOTE — PROGRESS NOTES
Preceptor Attestation:  Patient's case reviewed and discussed with Teresa Mena MD resident and I evaluated the patient. I agree with written assessment and plan of care.    Supervising Physician:  Ashanti Nair   [facility]

## 2017-03-23 ENCOUNTER — TELEPHONE (OUTPATIENT)
Dept: FAMILY MEDICINE | Facility: CLINIC | Age: 32
End: 2017-03-23

## 2017-03-23 NOTE — TELEPHONE ENCOUNTER
Interim call- TCM    TCM APPOINTMENT FOLLOW UP    Language:English    Medication questions: yes:  Explain: Reports when she gets sick she stays home, self medicates and gets better -    Specialist follow up: no    Concerns since last visit: yes:  Explain: Is thinking she may switch clinics, does not like having to explain her circumstances over and over and feeling disrespected, asked her if she felt that with all the doctors she has seen- and she said her primary has been a good doctor but has seen three others and is not happy with any    Next appointment at [facility]:Talked about switching clinics, asked if she would want to return and see her primary, who knows her dx and she would not have to explain and she is thinking about switching to a closer clinic - told her to give a call if she changes her mind.    Comments: pt was polite and thanked me for calling, encouraged her to call if she wishes to schedule with pcp and to use our 24 hr line as needed to ask questions    @[email or web address]@ yes       Brenda Baptiste

## 2017-03-24 NOTE — PROGRESS NOTES
"[facility] - Pharmacist Note  Transitional Care Management / Hospital Discharge Follow Up Note  Patient: Jennifer Barraza, : 1985, Sex: female, Encounter Date: 3/20/2017  Primary Physician  Navi Hall  Chief Complaint   Patient presents with     Medication Therapy Management       History of Present Illness  Jennifer Barraza is a 31 year old female was referred by [facility] team for transitional care management following two recent hospitalizations for asthma exacerbation. Most recently discharged 3/19/2016. Jennifer tolbert main concern today is need of refills and request for cough syrup.     Per discharge summary, medication changes made during hospitalization include the following:   Discontinued: Added (initiated): Changed (dose/frequency):        Robitussin DM PRN    Prednisone          Subjective  # Asthma exacerbation: Reports no relief w/ Robitussin DM. Would like alternative product. Has not started Prednisone, did not think she received from pharmacy but significant other who accompanies her to appointment states that was picked up. Reports has Dulera - knows to take BID. Has albuterol PRN - has  Been requiring frequent doses as of late. Reports uses Loratadine to help control allergies which plays role in asthma control.     # Medication adherence: Reports has difficulty remembering to take medicines with business of life / children.     # HTN: Reports took BP medicines this AM - Amlodipine, Lisinopril. Denies ADRs.     # Tobacco cessation: Reports has not yet started Chantix, wanted to wait until \"all of this\" is resolved and once she is better she can devote more time to becoming tobacco free. However has product available.     # Pain, chronic opioid use: Managed by outside specialist. Denies constipation.     Objective    Patient Active Problem List   Diagnosis     Moyamoya disease     Tobacco use disorder     Migraine     Lumbago     Vitamin D deficiency     " History of angina pectoris     Health Care Home     Lump or mass in breast     Lesion of lower extremity     Possible secondary hypertension     Severe persistent asthma     Partial seizures (H)     Weakness of left side of body     Major depressive disorder, recurrent, severe without psychotic features (H)     Depo-Provera contraceptive status     BP Readings from Last 3 Encounters:   03/20/17 (!) 176/98   03/17/17 (!) 205/131   12/21/16 (!) 173/106         Assessment/Plan  All medications were reviewed and found to be indicated, effective, safe and convenient/affordable unless drug therapy problem(s) identified, as noted below. Allergies and social history were reviewed today and updated in the EMR.    # Medication reconciliation/adherence: Seems to have long-standing issues w/ compliance. Not interested in assistance at this point.     # Asthma exacerbation: Has not carried out treatment recommended at D/C.     # Medication adherence: Reports has difficulty remembering to take medicines with business of life / children.     # HTN: Unclear if BP medicines taken this AM. BP above goal (<140/90 mmHg) may be evidence of lack of compliance.     # Tobacco cessation: Likely would have great gains with tobacco cessation in regards to asthma control. [place] as can start Chantix during tobacco use, unclear if patient aware of the dosing strategy.    # Pain: Not apart or focus of meeting today, but given intake of possibly 60 mg/day (90 morphine equivalents) - should consider Naloxone.     Plan:     Medication list was updated in the EMR to reflect current therapies (deleted medications patient no longer taking, added medications patient reported taking and changed orders if a discrepancy existed).     Pended needed refills for review by Demetrice Castillo-JERMAINE. All of which seem appropriate, match history.     In discussing different strategies to help improve medicine compliance, patient declines suggestions (pillbox, smart  phone apps, alarms). Encouraged patient to make effort to find strategy to improve medication compliance. States she will see if her daughter will help her remember (unclear age / appropriateness)    Instructed to continue Prednisone as ordered to help w/ asthma exacerbation. Reviewed maintenance / rescue inhalers and their use. Stressed importance.     Reviewed and provided instruction for Chantix use. Encouraged patient to consider.       Follow up: Pharmacist available per patient or provider request    Options for treatment and/or follow-up care were reviewed with the patient. Jennifer was engaged and actively involved in the decision making process. She verbalized understanding of the options discussed and was satisfied with the final plan. Patient was provided with written instructions/medication list via AVS.    Demetrice Castillo NP was provided our recommendations in clinic today and was available for supervision during this visit and is the authorizing prescriber for this visit through the pharmacist collaborative practice agreement.    Thank you for the opportunity to participate in the care of this patient.  Magali Bal, Pharm.D.    Current Outpatient Prescriptions   Medication Sig Dispense Refill     amLODIPine (NORVASC) 10 MG tablet Take 10 mg by mouth daily       oxyCODONE (ROXICODONE) 10 MG IR tablet Prescribed by [facility]. Takes 1 tablet by mouth 4-6 times per day.       varenicline (CHANTIX) 0.5 MG tablet Days 1 to 3: 0.5 mg once daily, Days 4 to 7: 0.5 mg twice daily, Thereafter use: 1 mg twice daily       ipratropium - albuterol 0.5 mg/2.5 mg/3 mL (DUONEB) 0.5-2.5 (3) MG/3ML neb solution Take 1 vial (3 mLs) by nebulization every 6 hours as needed 360 mL 0     aspirin 325 MG tablet Take 1 tablet (325 mg) by mouth daily 180 tablet 0     omeprazole (PRILOSEC) 20 MG CR capsule Take 1 capsule (20 mg) by mouth daily as needed 30 capsule 3     loratadine (CLARITIN) 10 MG tablet  Take 1 tablet (10 mg) by mouth daily 30 tablet 3     lisinopril (PRINIVIL/ZESTRIL) 40 MG tablet Take 1 tablet (40 mg) by mouth daily 30 tablet 3     mometasone-formoterol (DULERA) 200-5 MCG/ACT oral inhaler Inhale 2 puffs into the lungs 2 times daily 13 Inhaler 11     medroxyPROGESTERone (DEPO-PROVERA) 150 MG/ML injection Inject 1 mL (150 mg) into the muscle every 3 months for 21 days 1 mL 0     albuterol (ALBUTEROL) 108 (90 BASE) MCG/ACT inhaler 1-2 puffs Every 4-6 hours as needed for shortness of breath. Max 12 puffs/ day. 1 Inhaler 5     albuterol (2.5 MG/3ML) 0.083% nebulizer solution Take 1 vial (2.5 mg) by nebulization every 6 hours as needed for shortness of breath / dyspnea or wheezing 360 mL 3        Drug therapy problems identified:  Medical Condition 1: Asthma, Goals of therapy: Not at goal, Drug Class: Steroid, Convenience: Patient misunderstanding, Intervention: Initiate drug, Verification: Patient Agreed - Compliance/Education  Medical Condition 2: HTN, Goals of therapy 2: Not at goal, Drug Class 2: CCB,  Convenience 2: Patient forgets to take, Intervention 2: Add device or process to assist use, Verification 2: Patient Disagreed  Medical Condition 3: Smoking/Tobacco, Goals of therapy 3: Not at goal, Drug Class 3: Non-nicotine cessation medication, Convenience 3: Patient misunderstanding, Intervention 3: Educate patient, Verification 3: Patient Agreed - Compliance/Education  Medical Condition 4: Pain (i.e. somatic, visceral, neuropathic), Goals of therapy 4: Not at goal, Drug Class 4: Other (Naloxone), Indication 4: Needs additional drug therapy, Intervention 4: Initiate drug, Verification 4: Recommendation to Provider    Relevant medical devices: N/A    # of medical conditions addressed: 4  # of medications addressed: 10  # of DTP identified: 4  Time spent: 30 minutes  Level of service per [place] DHS guidelines: 5 nc

## 2017-03-27 RX ORDER — MEDROXYPROGESTERONE ACETATE 150 MG/ML
150 INJECTION, SUSPENSION INTRAMUSCULAR
Qty: 1 ML | Refills: 0 | OUTPATIENT
Start: 2017-06-05 | End: 2018-02-12

## 2017-05-15 DIAGNOSIS — I10 ESSENTIAL HYPERTENSION WITH GOAL BLOOD PRESSURE LESS THAN 140/90: ICD-10-CM

## 2017-05-15 DIAGNOSIS — Z72.0 TOBACCO ABUSE: Primary | ICD-10-CM

## 2017-05-15 RX ORDER — LISINOPRIL 40 MG/1
40 TABLET ORAL DAILY
Qty: 30 TABLET | Refills: 0 | Status: SHIPPED | OUTPATIENT
Start: 2017-05-15 | End: 2017-09-14

## 2017-05-15 RX ORDER — VARENICLINE TARTRATE 0.5 MG/1
TABLET, FILM COATED ORAL
Qty: 60 TABLET | Refills: 0 | Status: SHIPPED | OUTPATIENT
Start: 2017-05-15 | End: 2017-10-26

## 2017-05-16 NOTE — TELEPHONE ENCOUNTER
Called x 1 NA. Notified pharmacy to inform pt to set appt for further RFs per MD    Called informed pt to make appt in 1 month for RFs. Per pt she will call back for appt.

## 2017-06-19 ENCOUNTER — TELEPHONE (OUTPATIENT)
Dept: FAMILY MEDICINE | Facility: CLINIC | Age: 32
End: 2017-06-19

## 2017-06-19 NOTE — TELEPHONE ENCOUNTER
Prior Authorization needed on:  6/19/17    Medication:  OMEPRAZOLE DR Dose:  20 MG CAPSULE    SIG: TAKE 1 CAPSULE BY MOUTH DAILY PRN    Pharmacy confirmed as   [facility] [address] [place] [address] RD AT [address]  [place] [address]  Phone: [phone] Fax: [phone]  : Yes    Insurance Name:  [employer] [place]  Insurance Phone: [phone]  Insurance Patient ID: [id number]    Alternatives Suggested:  NONE PROVIDED    MD NOTIFIED TO COMPLETE PRIOR AUTH FORM ON COVER MY MEDS.    Roseanne Lovelace June 19, 2017 at 9:31 AM

## 2017-07-07 NOTE — TELEPHONE ENCOUNTER
Prior Authorization: Approved    Approved as of: no information provided on CMM (Cover My Meds)    Called pharmacy, left approval information on pharmacy VM.    Roseanne Lovelace July 7, 2017 at 2:40 PM

## 2017-07-23 NOTE — NURSING NOTE
BLOOD PRESSURE: 176/98    DATE OF LAST PAP or ANNUAL EXAM: No results found for: PAP  URINE HCG:not indicated    The following medication was given:     MEDICATION: Depo Provera 150mg  ROUTE: IM  SITE: RUQ - Gluteus  : [employer]  LOT #: [id number]  EXPIRATION:11/2019  NEXT INJECTION DUE: 6/5/17-6/26/2017  Provider: Demetrice MANN NP.    
Informed Demetrice WRIGHT of critical BP values 182/131  
The following medication was given:     MEDICATION: Ketorolac Tromethamine 30 mg/mL) (Toradol)  ROUTE: IM  SITE: LUQ - Gluteus  DOSE: 1mL  LOT #: [id number]  :  [id number]  EXPIRATION DATE:  02/01/2018  NDC#: 5753-7302-36  Demetrice MANN- Nurse practitioner        
Abdomen soft, nontender, nondistended, bowel sounds present in all 4 quadrants.

## 2017-08-14 DIAGNOSIS — J45.40 MODERATE PERSISTENT ASTHMA WITHOUT COMPLICATION: ICD-10-CM

## 2017-08-14 RX ORDER — AMLODIPINE BESYLATE 10 MG/1
10 TABLET ORAL DAILY
Qty: 30 TABLET | Refills: 0 | Status: SHIPPED | OUTPATIENT
Start: 2017-08-14 | End: 2017-09-13

## 2017-08-14 NOTE — TELEPHONE ENCOUNTER
Please advise patient to see her PCP or another provider before more medication refills can be provided.  CARYN Wall

## 2017-08-14 NOTE — TELEPHONE ENCOUNTER
Date of last visit: 3/20/2017     Date of next visit if scheduled: Visit date not found       Last Basic Metabolic Panel:  Lab Results   Component Value Date    .0 10/07/2016      Lab Results   Component Value Date    POTASSIUM 4.3 10/07/2016     Lab Results   Component Value Date    CHLORIDE 104.0 10/07/2016     Lab Results   Component Value Date    RONALDO 9.0 10/07/2016     Lab Results   Component Value Date    CO2 28.0 10/07/2016     Lab Results   Component Value Date    BUN 6.0 10/07/2016     Lab Results   Component Value Date    CR 0.6 10/07/2016     Lab Results   Component Value Date    GLC 90.0 10/07/2016       BP Readings from Last 3 Encounters:   03/20/17 (!) 176/98   03/17/17 (!) 205/131   12/21/16 (!) 173/106       Lab Results   Component Value Date    A1C 5.2 10/08/2015                Please complete refill and CLOSE ENCOUNTER.  Closing the encounter signifies the refill is complete.

## 2017-08-15 ENCOUNTER — TRANSFERRED RECORDS (OUTPATIENT)
Dept: HEALTH INFORMATION MANAGEMENT | Facility: CLINIC | Age: 32
End: 2017-08-15

## 2017-08-24 DIAGNOSIS — J45.998 PERSISTENT ASTHMA WITH UNDETERMINED SEVERITY: Primary | ICD-10-CM

## 2017-08-24 DIAGNOSIS — J45.51 SEVERE PERSISTENT ASTHMA WITH ACUTE EXACERBATION (H): ICD-10-CM

## 2017-08-24 RX ORDER — IPRATROPIUM BROMIDE AND ALBUTEROL SULFATE 2.5; .5 MG/3ML; MG/3ML
3 SOLUTION RESPIRATORY (INHALATION) EVERY 6 HOURS PRN
Qty: 15 VIAL | Refills: 0 | Status: SHIPPED | OUTPATIENT
Start: 2017-08-24 | End: 2017-09-05

## 2017-08-30 ENCOUNTER — TELEPHONE (OUTPATIENT)
Dept: FAMILY MEDICINE | Facility: CLINIC | Age: 32
End: 2017-08-30

## 2017-08-30 NOTE — TELEPHONE ENCOUNTER
Called pt to check in and update with her that the dr was prescribing the duo neb which she said she got, she then said she needs to come in  Reviewed schedules as she would like to have Dr Hall as Primary Care Physician.  Her schedule was not working with his current availability so she will call when she has her schedule further out.     Let [facility]

## 2017-09-05 DIAGNOSIS — J45.998 PERSISTENT ASTHMA WITH UNDETERMINED SEVERITY: ICD-10-CM

## 2017-09-06 RX ORDER — IPRATROPIUM BROMIDE AND ALBUTEROL SULFATE 2.5; .5 MG/3ML; MG/3ML
3 SOLUTION RESPIRATORY (INHALATION) EVERY 6 HOURS PRN
Qty: 30 VIAL | Refills: 1 | Status: SHIPPED | OUTPATIENT
Start: 2017-09-06 | End: 2018-01-04

## 2017-09-13 DIAGNOSIS — J45.40 MODERATE PERSISTENT ASTHMA WITHOUT COMPLICATION: ICD-10-CM

## 2017-09-13 DIAGNOSIS — I10 ESSENTIAL HYPERTENSION WITH GOAL BLOOD PRESSURE LESS THAN 140/90: ICD-10-CM

## 2017-09-15 RX ORDER — AMLODIPINE BESYLATE 10 MG/1
10 TABLET ORAL DAILY
Qty: 30 TABLET | Refills: 0 | Status: SHIPPED | OUTPATIENT
Start: 2017-09-15

## 2017-09-15 RX ORDER — LISINOPRIL 40 MG/1
40 TABLET ORAL DAILY
Qty: 30 TABLET | Refills: 0 | Status: SHIPPED | OUTPATIENT
Start: 2017-09-15

## 2017-09-15 NOTE — TELEPHONE ENCOUNTER
Needs to see her primary doctor before any further refills.  Notation to pharmacy as to 1 month refill plan until return to clinic.

## 2017-09-25 DIAGNOSIS — J45.998 PERSISTENT ASTHMA WITH UNDETERMINED SEVERITY: ICD-10-CM

## 2017-10-03 ENCOUNTER — TELEPHONE (OUTPATIENT)
Dept: FAMILY MEDICINE | Facility: CLINIC | Age: 32
End: 2017-10-03

## 2017-10-03 RX ORDER — IPRATROPIUM BROMIDE AND ALBUTEROL SULFATE 2.5; .5 MG/3ML; MG/3ML
3 SOLUTION RESPIRATORY (INHALATION) EVERY 6 HOURS PRN
Qty: 90 VIAL | OUTPATIENT
Start: 2017-10-03

## 2017-10-03 NOTE — TELEPHONE ENCOUNTER
Called pt to inform her that to refill medications moving forward, we would need her in for asthma check - due for duoneb  She thought she still had auto refill, informed her for going forward we need to check in and follow up prior to refills.    Pt to call for appt when ready    rojas

## 2017-10-26 ENCOUNTER — OFFICE VISIT (OUTPATIENT)
Dept: FAMILY MEDICINE | Facility: CLINIC | Age: 32
End: 2017-10-26

## 2017-10-26 VITALS
HEART RATE: 104 BPM | HEIGHT: 61 IN | BODY MASS INDEX: 27.56 KG/M2 | OXYGEN SATURATION: 100 % | RESPIRATION RATE: 16 BRPM | DIASTOLIC BLOOD PRESSURE: 132 MMHG | TEMPERATURE: 97.9 F | WEIGHT: 146 LBS | SYSTOLIC BLOOD PRESSURE: 189 MMHG

## 2017-10-26 DIAGNOSIS — I10 ESSENTIAL HYPERTENSION, BENIGN: Primary | ICD-10-CM

## 2017-10-26 DIAGNOSIS — Z30.42 ENCOUNTER FOR MANAGEMENT AND INJECTION OF DEPO-PROVERA: ICD-10-CM

## 2017-10-26 DIAGNOSIS — G89.29 CHRONIC LEFT-SIDED LOW BACK PAIN WITHOUT SCIATICA: ICD-10-CM

## 2017-10-26 DIAGNOSIS — R52 PAIN MANAGEMENT: ICD-10-CM

## 2017-10-26 DIAGNOSIS — M54.50 CHRONIC LEFT-SIDED LOW BACK PAIN WITHOUT SCIATICA: ICD-10-CM

## 2017-10-26 LAB — HCG UR QL: NEGATIVE

## 2017-10-26 RX ORDER — HYDROCHLOROTHIAZIDE 25 MG/1
25 TABLET ORAL DAILY
Qty: 90 TABLET | Refills: 1 | Status: SHIPPED | OUTPATIENT
Start: 2017-10-26 | End: 2018-02-12

## 2017-10-26 NOTE — MR AVS SNAPSHOT
After Visit Summary   10/26/2017    Jennifer Barraza    MRN: [id number]           Patient Information     Date Of Birth          1985        Visit Information        Provider Department      10/26/2017 1:40 PM Chayo Zaidi DO [facility]        Today's Diagnoses     Essential hypertension, benign    -  1    Chronic left-sided low back pain without sciatica        Pain management        Encounter for management and injection of depo-Provera          Care Instructions    Start hydrochlorothiazide 25mg a day as well as the other blood pressure medication.    Hold off on energy drinks as much as you can.    Start depo today     Referral for pain clinic management     Follow up in 2 weeks for BP follow up     ~~~~~~~~~~~~  Your medication list is printed, please keep this with you, it is helpful to bring this current list to any other medical appointments, the emergency room or hospital.    If you had lab testing today and your results are reassuring or normal they will be be mailed to you within 7 days.     If the lab tests need quick action we will call you with the results.   The phone number we will call with results is # [phone] (home) [phone] (work). If this is not the best number please call our clinic and change the number.    If you need any refills please call your pharmacy and they will contact us.    If you have any further concerns or wish to schedule another appointment you must call our office during normal business hours  [phone] (8-5:00 M-F)  If you have urgent medical questions that cannot wait  you may also call [phone] at any time of day.  If you have a medical emergency please call [phone].    Thank you for coming to [facility].              Follow-ups after your visit        Additional Services     PAIN MANAGEMENT REFERRAL (External)       Patient prefers to be called    Reason for Referral: pain management      needed: No  Language:  "English    May leave message on voicemail: Yes    ([facility] Only) Referral should be tracked: Yes                  Follow-up notes from your care team     Return in about 2 weeks (around 2017) for BP Recheck.      Who to contact     Please call your clinic at [phone] to:    Ask questions about your health    Make or cancel appointments    Discuss your medicines    Learn about your test results    Speak to your doctor   If you have compliments or concerns about an experience at your clinic, or if you wish to file a complaint, please contact [facility] Patient Relations at [phone] or email us at [email or web address]         Additional Information About Your Visit        Hoseannahart Information     Pixelated is an electronic gateway that provides easy, online access to your medical records. With Pixelated, you can request a clinic appointment, read your test results, renew a prescription or communicate with your care team.     To sign up for Pixelated visit the website at [email or web address]   You will be asked to enter the access code listed below, as well as some personal information. Please follow the directions to create your username and password.     Your access code is: [email or web address]  Expires: 2018  3:13 PM     Your access code will  in 90 days. If you need help or a new code, please contact your [facility] or call [phone] for assistance.        Care EveryWhere ID     This is your Care EveryWhere ID. This could be used by other organizations to access your [facility] medical records  [id number]        Your Vitals Were     Pulse Temperature Respirations Height Pulse Oximetry BMI (Body Mass Index)    104 97.9  F (36.6  C) (Oral) 16 5' 0.5\" (153.7 cm) 100% 28.04 kg/m2       Blood Pressure from Last 3 Encounters:   10/26/17 (!) 189/132   17 (!) 176/98   17 (!) 205/131    Weight from Last 3 Encounters: "   10/26/17 146 lb (66.2 kg)   03/20/17 153 lb 9.6 oz (69.7 kg)   03/17/17 153 lb (69.4 kg)              We Performed the Following     HCG Qualitative Urine (UPT)  ([facility])     PAIN MANAGEMENT REFERRAL (External)          Today's Medication Changes          These changes are accurate as of: 10/26/17  3:13 PM.  If you have any questions, ask your nurse or doctor.               Start taking these medicines.        Dose/Directions    hydrochlorothiazide 25 MG tablet   Commonly known as:  HYDRODIURIL   Used for:  Essential hypertension, benign   Started by:  Chayo Zaidi DO        Dose:  25 mg   Take 1 tablet (25 mg) by mouth daily   Quantity:  90 tablet   Refills:  1            Where to get your medicines      These medications were sent to [id number] [place], [address] AT [address], [place] [address]     Phone:  [phone]     hydrochlorothiazide 25 MG tablet                Primary Care Provider Office Phone # Fax #    Navi Mario Hall [phone]       [facility] [address] [place] [address]        Equal Access to Services     ELISEO MELENDEZ AH: Hadii miguel miles hadasho Soomaali, waaxda luqadaha, qaybta kaalmada adeegyada, toyin crespo. So [facility] [phone].    ATENCIÓN: Si habla español, tiene a hernandez disposición servicios gratuitos de asistencia lingüística. Llame al [phone].    We comply with applicable federal civil rights laws and [place] laws. We do not discriminate on the basis of race, color, national origin, age, disability, sex, sexual orientation, or gender identity.            Thank you!     Thank you for choosing [facility]  for your care. Our goal is always to provide you with excellent care. Hearing back from our patients is one way we can continue to improve our services. Please take a few minutes to complete the written survey that you may  receive in the mail after your visit with us. Thank you!             Your Updated Medication List - Protect others around you: Learn how to safely use, store and throw away your medicines at www.disposemymeds.org.          This list is accurate as of: 10/26/17  3:13 PM.  Always use your most recent med list.                   Brand Name Dispense Instructions for use Diagnosis    albuterol 108 (90 BASE) MCG/ACT Inhaler    PROAIR HFA    1 Inhaler    1-2 puffs Every 4-6 hours as needed for shortness of breath. Max 12 puffs/ day.    Moderate persistent asthma without complication       amLODIPine 10 MG tablet    NORVASC    30 tablet    Take 1 tablet (10 mg) by mouth daily    Moderate persistent asthma without complication       aspirin 325 MG tablet     180 tablet    Take 1 tablet (325 mg) by mouth daily    Encounter for medication refill       hydrochlorothiazide 25 MG tablet    HYDRODIURIL    90 tablet    Take 1 tablet (25 mg) by mouth daily    Essential hypertension, benign       ipratropium - albuterol 0.5 mg/2.5 mg/3 mL 0.5-2.5 (3) MG/3ML neb solution    DUONEB    30 vial    Take 1 vial (3 mLs) by nebulization every 6 hours as needed    Persistent asthma with undetermined severity       lisinopril 40 MG tablet    PRINIVIL/ZESTRIL    30 tablet    Take 1 tablet (40 mg) by mouth daily    Essential hypertension with goal blood pressure less than 140/90       loratadine 10 MG tablet    CLARITIN    30 tablet    Take 1 tablet (10 mg) by mouth daily    Encounter for medication refill       medroxyPROGESTERone 150 MG/ML injection    DEPO-PROVERA    1 mL    Inject 1 mL (150 mg) into the muscle every 3 months    Encounter for surveillance of injectable contraceptive       mometasone-formoterol 200-5 MCG/ACT oral inhaler    DULERA    13 Inhaler    Inhale 2 puffs into the lungs 2 times daily    Moderate persistent asthma without complication       omeprazole 20 MG CR capsule    priLOSEC    30 capsule    Take 1 capsule (20 mg)  by mouth daily as needed    Severe persistent asthma with acute exacerbation       oxyCODONE 10 MG IR tablet    ROXICODONE     Prescribed by [facility]. Takes 1 tablet by mouth 4-6 times per day.    Myofascial pain

## 2017-10-26 NOTE — PROGRESS NOTES
HPI:       Jennifer Barraza is a 32 year old  female with a significant past medical history of back pain and Moyamoya  who presents for the new concern(s) of    1.On 10-12-17 thru 10-14-17 was hospitalized at [facility], seen for HTN urgency with vomiting and headache and elevated blood pressure, SBP in 200s, bleeding has stopped, fatigue, body aches, will go to [facility] for blood pressure check, 190/140s  Currently, she is not having chest pain, or has chronic low back pain with a herniated disk in low back and neck, carpal tunnel, and some knee pain  She has no headache or visual disturbance (history of migraines), no nose bleeds, had a cigarette before coming in to clinic, had a energy drink right now (still sipping on it), 180/110, take both BP meds at the same time. Normal BMP on 10-12-17. Surgery for Reid Reid about 5 years ago    2.Chronic back pain: No tylenol, Ibuprofen, excedrin migraine, heat, Had been at [facility] since 2013 (there is some legal concern), stopped going in 7/2017, on  review she was last given 170 oxycodone #10, left school in 3/2017 due to her pain, also had been to [facility] pain clinic, has tried gabapentin about 5 years ago, need to review imaging, has consider injections in the past.  Her last MRI was in 2014: showed mild degenerative disk disease at L4-L5 and L5-S1.    3. Depo: Was due for depo back in June, she was sexually active in September, had a home UPT on 10-17-17         PMHX:     Patient Active Problem List   Diagnosis     Moyamoya disease     Tobacco use disorder     Migraine     Lumbago     Vitamin D deficiency     History of angina pectoris     Health Care Home     Lump or mass in breast     Lesion of lower extremity     Possible secondary hypertension     Severe persistent asthma     Partial seizures (H)     Weakness of left side of body     Major depressive disorder, recurrent, severe without psychotic features (H)      Depo-Provera contraceptive status       Current Outpatient Prescriptions   Medication Sig Dispense Refill     hydrochlorothiazide (HYDRODIURIL) 25 MG tablet Take 1 tablet (25 mg) by mouth daily 90 tablet 1     amLODIPine (NORVASC) 10 MG tablet Take 1 tablet (10 mg) by mouth daily 30 tablet 0     lisinopril (PRINIVIL/ZESTRIL) 40 MG tablet Take 1 tablet (40 mg) by mouth daily 30 tablet 0     ipratropium - albuterol 0.5 mg/2.5 mg/3 mL (DUONEB) 0.5-2.5 (3) MG/3ML neb solution Take 1 vial (3 mLs) by nebulization every 6 hours as needed 30 vial 1     medroxyPROGESTERone (DEPO-PROVERA) 150 MG/ML injection Inject 1 mL (150 mg) into the muscle every 3 months 1 mL 0     aspirin 325 MG tablet Take 1 tablet (325 mg) by mouth daily 180 tablet 0     omeprazole (PRILOSEC) 20 MG CR capsule Take 1 capsule (20 mg) by mouth daily as needed 30 capsule 3     loratadine (CLARITIN) 10 MG tablet Take 1 tablet (10 mg) by mouth daily 30 tablet 3     mometasone-formoterol (DULERA) 200-5 MCG/ACT oral inhaler Inhale 2 puffs into the lungs 2 times daily 13 Inhaler 11     albuterol (ALBUTEROL) 108 (90 BASE) MCG/ACT inhaler 1-2 puffs Every 4-6 hours as needed for shortness of breath. Max 12 puffs/ day. 1 Inhaler 5     oxyCODONE (ROXICODONE) 10 MG IR tablet Prescribed by [facility]. Takes 1 tablet by mouth 4-6 times per day.         Social History     Social History     Marital status: Single     Spouse name: N/A     Number of children: N/A     Years of education: N/A     Occupational History     Not on file.     Social History Main Topics     Smoking status: Current Every Day Smoker     Packs/day: 0.50     Types: Cigarettes     Smokeless tobacco: Never Used      Comment: About 12 cigarettes a day, but patient is trying to quit.      Alcohol use No     Drug use: No     Sexual activity: Not on file     Other Topics Concern     Not on file     Social History Narrative          Allergies   Allergen Reactions     Bee Venom Anaphylaxis  "    Doesn't have epi pen now.     Ativan      Bupropion Other (See Comments)     No reaction known.  Seizure history, do not use.     Diphenhydramine      Doxylamine Other (See Comments)     Eggs [Chicken-Derived Products (Egg)]      Lorazepam Other (See Comments)     Delusional thoughts     Pear      Unisom [Doxylamine Succinate]      Vicodin [Hydrocodone-Acetaminophen] Itching     Ok on plain acetaminophen     Wellbutrin [Bupropion Hydrobromide]      No reaction known.  Seizure history, do not use.       No results found for this or any previous visit (from the past 24 hour(s)).         Review of Systems:   C: NEGATIVE for fatigue, unexpected change in weight  E: NEGATIVE for acute vision problems or changes  R: NEGATIVE for significant cough or shortness of breath  CV: NEGATIVE for chest pain, palpitations or new or worsening peripheral edema          Physical Exam:     Vitals:    10/26/17 1357   BP: (!) 189/132   Pulse: 104   Resp: 16   Temp: 97.9  F (36.6  C)   TempSrc: Oral   SpO2: 100%   Weight: 146 lb (66.2 kg)   Height: 5' 0.5\" (153.7 cm)     Body mass index is 28.04 kg/(m^2).    GENERAL APPEARANCE: healthy, alert and no distress,  HENT: ear canals and TM's normal and nose and mouth without ulcers or lesions  NECK: no adenopathy, no asymmetry, masses, or scars and thyroid normal to palpation  RESP: lungs clear to auscultation - no rales, rhonchi or wheezes  CV: regular rate and rhythm,  and no murmur, click,  rub or gallop  MS: extremities normal- no gross deformities noted  MS: Pain with palpation at left quadratus region, very tender with light touch to mid spine, patellar reflexes +2      Assessment and Plan     1. Essential hypertension, benign  - hydrochlorothiazide (HYDRODIURIL) 25 MG tablet; Take 1 tablet (25 mg) by mouth daily  Dispense: 90 tablet; Refill: 1  Return to clinic in 2 weeks to review blood pressure    2. Chronic left-sided low back pain without sciatica  Back stretches, has significant " pain, recommended pain clinic, had previously been seen at [facility], but discharged due to positive UDS    3. Pain management    - PAIN MANAGEMENT REFERRAL (External)    4. Encounter for management and injection of depo-Provera  - INJECTION INTRAMUSCULAR OR SUB-Q  - medroxyPROGESTERone (DEPO-PROVERA) injection 150 mg (Charge)  - HCG Qualitative Urine (UPT)  ([facility])    Options for treatment and follow-up care were reviewed with the patient and/or guardian. Jennifer Barraza and/or guardian engaged in the decision making process and verbalized understanding of the options discussed and agreed with the final plan.    Chayo Zaidi, DO

## 2017-10-26 NOTE — NURSING NOTE
I have administered the following injection to Jennifer white:    BP: 189/132    LAST PAP/EXAM: No results found for: PAP  URINE HCG:negative :      The following medication was given:     MEDICATION: Depo Provera 150mg  ROUTE: IM  SITE: RUQ - Gluteus  : Quadriserv  LOT #: [id number]   EXP:02/2020  NEXT INJECTION DUE: 1/11/18 - 1/25/18   Provider: Dejuan Hall

## 2017-10-26 NOTE — PATIENT INSTRUCTIONS
Start hydrochlorothiazide 25mg a day as well as the other blood pressure medication.    Hold off on energy drinks as much as you can.    Start depo today     Referral for pain clinic management     Follow up in 2 weeks for BP follow up     ~~~~~~~~~~~~  Your medication list is printed, please keep this with you, it is helpful to bring this current list to any other medical appointments, the emergency room or hospital.    If you had lab testing today and your results are reassuring or normal they will be be mailed to you within 7 days.     If the lab tests need quick action we will call you with the results.   The phone number we will call with results is # [phone] (home) NONE (work). If this is not the best number please call our clinic and change the number.    If you need any refills please call your pharmacy and they will contact us.    If you have any further concerns or wish to schedule another appointment you must call our office during normal business hours  [phone] (8-5:00 M-F)  If you have urgent medical questions that cannot wait  you may also call [phone] at any time of day.  If you have a medical emergency please call [phone].    Thank you for coming to [facility].      Referral for Pain Management along with OV notes faxed to the [facility] at [phone] and the new pt  will cont pt to set up appointment.  YANNA

## 2017-10-27 ASSESSMENT — ASTHMA QUESTIONNAIRES: ACT_TOTALSCORE: 8

## 2017-11-09 ENCOUNTER — RECORDS - HEALTHEAST (OUTPATIENT)
Dept: ADMINISTRATIVE | Facility: OTHER | Age: 32
End: 2017-11-09

## 2017-11-09 ENCOUNTER — OFFICE VISIT (OUTPATIENT)
Dept: FAMILY MEDICINE | Facility: CLINIC | Age: 32
End: 2017-11-09

## 2017-11-09 VITALS
SYSTOLIC BLOOD PRESSURE: 217 MMHG | OXYGEN SATURATION: 100 % | BODY MASS INDEX: 27.75 KG/M2 | HEART RATE: 93 BPM | DIASTOLIC BLOOD PRESSURE: 138 MMHG | WEIGHT: 147 LBS | HEIGHT: 61 IN | TEMPERATURE: 98.1 F | RESPIRATION RATE: 16 BRPM

## 2017-11-09 DIAGNOSIS — F33.1 MAJOR DEPRESSIVE DISORDER, RECURRENT EPISODE, MODERATE (H): ICD-10-CM

## 2017-11-09 DIAGNOSIS — I10 ESSENTIAL HYPERTENSION, BENIGN: Primary | ICD-10-CM

## 2017-11-09 DIAGNOSIS — I15.9 SECONDARY HYPERTENSION: ICD-10-CM

## 2017-11-09 DIAGNOSIS — J45.40 MODERATE PERSISTENT ASTHMA WITHOUT COMPLICATION: ICD-10-CM

## 2017-11-09 LAB
BUN SERPL-MCNC: 6 MG/DL (ref 5–24)
CALCIUM SERPL-MCNC: 9.2 MG/DL (ref 8.5–10.4)
CHLORIDE SERPLBLD-SCNC: 104 MMOL/L (ref 94–109)
CO2 SERPL-SCNC: 27 MMOL/L (ref 20–32)
CREAT SERPL-MCNC: 1 MG/DL (ref 0.6–1.3)
EGFR CALCULATED (BLACK REFERENCE): 82.1 ML/MIN
EGFR CALCULATED (NON BLACK REFERENCE): 67.9 ML/MIN
GLUCOSE SERPL-MCNC: 79 MG/DL (ref 60–109)
HCT VFR BLD AUTO: 37.5 % (ref 35–47)
HEMOGLOBIN: 11.4 G/DL (ref 11.7–15.7)
MCH RBC QN AUTO: 27.5 PG (ref 26.5–35)
MCHC RBC AUTO-ENTMCNC: 30.4 G/DL (ref 32–36)
MCV RBC AUTO: 90.7 FL (ref 78–100)
PLATELET # BLD AUTO: 190 K/UL (ref 150–450)
POTASSIUM SERPL-SCNC: 3.7 MMOL/L (ref 3.4–5.3)
RBC # BLD AUTO: 4.14 M/UL (ref 3.8–5.2)
SODIUM SERPL-SCNC: 141 MMOL/L (ref 133–144)
TSH SERPL DL<=0.05 MIU/L-ACNC: 0.61 UIU/ML (ref 0.3–5)
WBC # BLD AUTO: 5.5 K/UL (ref 4–11)

## 2017-11-09 RX ORDER — DULOXETIN HYDROCHLORIDE 20 MG/1
20 CAPSULE, DELAYED RELEASE ORAL DAILY
Qty: 30 CAPSULE | Refills: 3 | Status: SHIPPED | OUTPATIENT
Start: 2017-11-09 | End: 2018-02-12

## 2017-11-09 ASSESSMENT — PATIENT HEALTH QUESTIONNAIRE - PHQ9: SUM OF ALL RESPONSES TO PHQ QUESTIONS 1-9: 22

## 2017-11-09 NOTE — PATIENT INSTRUCTIONS
Try doing the lisinopril and hydrochlorothiazide in the mornings and the amlodipine later in the day     We will do some blood work today as well    Restart Cymbalta 20mg daily    ~~~~~~~~~~~~~~~~~~~~~~~~~~~    Your medication list is printed, please keep this with you, it is helpful to bring this current list to any other medical appointments, the emergency room or hospital.    If you had lab testing today and your results are reassuring or normal they will be be mailed to you within 7 days.     If the lab tests need quick action we will call you with the results.   The phone number we will call with results is # [phone] (home) NONE (work). If this is not the best number please call our clinic and change the number.    If you need any refills please call your pharmacy and they will contact us.    If you have any further concerns or wish to schedule another appointment you must call our office during normal business hours  [phone] (8-5:00 M-F)  If you have urgent medical questions that cannot wait  you may also call [phone] at any time of day.  If you have a medical emergency please call [phone].    Thank you for coming to [facility].    My Asthma Action Plan  Name: Jennifer Barraza  YOB: 1985  Date: 11/9/2017   My doctor: Navi Hlal   My clinic:   [facility] [address] [place] [address]  [phone]    My Asthma Severity: moderate persistent Avoid your asthma triggers: smoke, upper respiratory infections, pollens and exercise or sports      GREEN ZONE   Good Control    I feel good    No cough or wheeze    Can work, sleep and play without asthma symptoms       Take your asthma control medicine every day.  Take the medications listed below daily.    Dulera  200/5 mcg 2 puffs twice a day    1. If exercise triggers your asthma, take your rescue medication (2 puffs of albuterol, Ventolin/Pro-Air) 15 minutes before exercise or sports, and  during exercise if you have asthma symptoms.  2. Spacer to use with inhaler: If you have a spacer, make sure to use it with your inhaler.              YELLOW ZONE Getting Worse  I have ANY of these:    I do not feel good    Cough or wheeze    Chest feels tight    Wake up at night   1. Keep taking your Green Zone medications.  2. Start taking your rescue medicine (1-2 puffs of albuterol - Ventolin/Pro-Air) every 4-6 hours as needed.  3. If symptoms are not controlled with above, can take 2 puffs every 20 minutes for up to 1 hour, then continue every 4 hours if needed.   4. If you do not return to the Green Zone in 12-24 hours or you get worse, call the clinic.         RED ZONE Medical Alert - Get Help  I have ANY of these:    I feel awful    Medicine is not helping    Breathing getting harder    Trouble walking or talking    Nose opens wide to breathe       1. Take your rescue medicine NOW (6-8 puffs of albuterol - Ventolin/Pro-Air) for every 20 minutes for up to 1 hour.  2. Call your doctor NOW.  3. If you are still in the Red Zone after 20 minutes and you have not reached your doctor:    Take your rescue medicine again (6-8 puffs of albuterol - Ventolin/Pro-Air) and    Call 911 or go to the emergency room right away    See your regular doctor within 1 weeks of an Emergency Room or Urgent Care visit for follow-up treatment.        This Asthma Action Plan provides authorization for the administration of medication described in the AAP.  YES    Electronically signed by: Chayo Zaidi    Annual Reminders:  Meet with Asthma Educator,  Flu Shot in the Fall, Pneumonia Shot  Pharmacy: [facility] [address] [place] [address] AT [facility]      Referral for ( TEST )  :      Echocardiogram  LOCATION/PLACE/Provider :    [facility]   DATE & TIME :     11- at 10:00am  PHONE :     [phone]  FAX :     [phone]  ADDITIONAL INFORMATION :     NA  Appointment made by  clinic staff/:    Demetrice

## 2017-11-09 NOTE — MR AVS SNAPSHOT
After Visit Summary   11/9/2017    Jennifer Barraza    MRN: [id number]           Patient Information     Date Of Birth          1985        Visit Information        Provider Department      11/9/2017 2:00 PM Chayo Zaidi DO [facility]        Today's Diagnoses     Essential hypertension, benign    -  1    Possible secondary hypertension        Screening for condition        Moderate persistent asthma without complication        Major depressive disorder, recurrent episode, moderate (H)          Care Instructions    Try doing the lisinopril and hydrochlorothiazide in the mornings and the amlodipine later in the day     We will do some blood work today as well    Restart Cymbalta 20mg daily    ~~~~~~~~~~~~~~~~~~~~~~~~~~~    Your medication list is printed, please keep this with you, it is helpful to bring this current list to any other medical appointments, the emergency room or hospital.    If you had lab testing today and your results are reassuring or normal they will be be mailed to you within 7 days.     If the lab tests need quick action we will call you with the results.   The phone number we will call with results is # [phone] (home) NONE (work). If this is not the best number please call our clinic and change the number.    If you need any refills please call your pharmacy and they will contact us.    If you have any further concerns or wish to schedule another appointment you must call our office during normal business hours  [phone] (8-5:00 M-F)  If you have urgent medical questions that cannot wait  you may also call [phone] at any time of day.  If you have a medical emergency please call [phone].    Thank you for coming to [facility].    My Asthma Action Plan  Name: Jennifer Barraza  YOB: 1985  Date: 11/9/2017   My doctor: Navi Hall   My clinic:   [facility] [address] [place]  [address]  [phone]    My Asthma Severity: moderate persistent Avoid your asthma triggers: smoke, upper respiratory infections, pollens and exercise or sports      GREEN ZONE   Good Control    I feel good    No cough or wheeze    Can work, sleep and play without asthma symptoms       Take your asthma control medicine every day.  Take the medications listed below daily.    Dulera  200/5 mcg 2 puffs twice a day    1. If exercise triggers your asthma, take your rescue medication (2 puffs of albuterol, Ventolin/Pro-Air) 15 minutes before exercise or sports, and during exercise if you have asthma symptoms.  2. Spacer to use with inhaler: If you have a spacer, make sure to use it with your inhaler.              YELLOW ZONE Getting Worse  I have ANY of these:    I do not feel good    Cough or wheeze    Chest feels tight    Wake up at night   1. Keep taking your Green Zone medications.  2. Start taking your rescue medicine (1-2 puffs of albuterol - Ventolin/Pro-Air) every 4-6 hours as needed.  3. If symptoms are not controlled with above, can take 2 puffs every 20 minutes for up to 1 hour, then continue every 4 hours if needed.   4. If you do not return to the Green Zone in 12-24 hours or you get worse, call the clinic.         RED ZONE Medical Alert - Get Help  I have ANY of these:    I feel awful    Medicine is not helping    Breathing getting harder    Trouble walking or talking    Nose opens wide to breathe       1. Take your rescue medicine NOW (6-8 puffs of albuterol - Ventolin/Pro-Air) for every 20 minutes for up to 1 hour.  2. Call your doctor NOW.  3. If you are still in the Red Zone after 20 minutes and you have not reached your doctor:    Take your rescue medicine again (6-8 puffs of albuterol - Ventolin/Pro-Air) and    Call 911 or go to the emergency room right away    See your regular doctor within 1 weeks of an Emergency Room or Urgent Care visit for follow-up treatment.        This Asthma Action Plan provides  authorization for the administration of medication described in the AAP.  YES    Electronically signed by: Chayo Zaidi    Annual Reminders:  Meet with Asthma Educator,  Flu Shot in the Fall, Pneumonia Shot  Pharmacy: [facility] [address] [place] [address] [place], [place] - [address] [place] [address] RD AT [facility]          Follow-ups after your visit        Your next 10 appointments already scheduled     2017 11:20 AM CST   Return Visit with Chayo Zaidi,    [facility] ([facility])    [address] [place] [address]   [phone]              Who to contact     Please call your clinic at [phone] to:    Ask questions about your health    Make or cancel appointments    Discuss your medicines    Learn about your test results    Speak to your doctor   If you have compliments or concerns about an experience at your clinic, or if you wish to file a complaint, please contact [facility] Patient Relations at [phone] or email us at [email or web address]         Additional Information About Your Visit        [employer] Information     [employer] is an electronic gateway that provides easy, online access to your medical records. With [employer], you can request a clinic appointment, read your test results, renew a prescription or communicate with your care team.     To sign up for [employer] visit the website at [email or web address]   You will be asked to enter the access code listed below, as well as some personal information. Please follow the directions to create your username and password.     Your access code is: [email or web address]  Expires: 2018  2:13 PM     Your access code will  in 90 days. If you need help or a new code, please contact your [facility] Clinic or call [phone] for assistance.        Care EveryWhere ID     This is your Care EveryWhere ID. This could be used by other organizations to access your  "[facility] medical records  [id number]        Your Vitals Were     Pulse Temperature Respirations Height Pulse Oximetry BMI (Body Mass Index)    93 98.1  F (36.7  C) (Oral) 16 5' 0.5\" (153.7 cm) 100% 28.24 kg/m2       Blood Pressure from Last 3 Encounters:   11/09/17 (!) 217/138   10/26/17 (!) 189/132   03/20/17 (!) 176/98    Weight from Last 3 Encounters:   11/09/17 147 lb (66.7 kg)   10/26/17 146 lb (66.2 kg)   03/20/17 153 lb 9.6 oz (69.7 kg)              We Performed the Following     Asthma Action Plan (AAP)     Basic Metabolic Panel ([facility]) - Results < 1 hr     CBC with Plt ([facility])     Echocardiogram Complete     EKG 12-lead complete w/read - Clinics     TSH  Sensitive ([facility])          Today's Medication Changes          These changes are accurate as of: 11/9/17  3:01 PM.  If you have any questions, ask your nurse or doctor.               Start taking these medicines.        Dose/Directions    DULoxetine 20 MG EC capsule   Commonly known as:  CYMBALTA   Used for:  Major depressive disorder, recurrent episode, moderate (H)   Started by:  Chayo Zaidi DO        Dose:  20 mg   Take 1 capsule (20 mg) by mouth daily   Quantity:  30 capsule   Refills:  3       order for DME   Used for:  Essential hypertension, benign   Started by:  Chayo Zaidi DO        Please check your blood pressure daily   Quantity:  1 each   Refills:  0            Where to get your medicines      These medications were sent to [id number] - [place]Y [place] [address] AT [address] [place] [address]     Phone:  [phone]     DULoxetine 20 MG EC capsule         Some of these will need a paper prescription and others can be bought over the counter.  Ask your nurse if you have questions.     Bring a paper prescription for each of these medications     order for DME                Primary Care Provider Office Phone # Fax #    Navi Hall [phone] " [phone]       [facility] [address] [place] [address]        Equal Access to Services     ELISEO MELENDEZ : Hadii aad ku hadsusyo Soshital, waaxda luqadaha, qaybta kaalmada ajckmele, waxtheresa rosemariein hayaaadithya santiagonoa jodibuddy boo crespo. So [facility] [phone].    ATENCIÓN: Si habla español, tiene a hernandez disposición servicios gratuitos de asistencia lingüística. Ehsan al [phone].    We comply with applicable federal civil rights laws and [place] laws. We do not discriminate on the basis of race, color, national origin, age, disability, sex, sexual orientation, or gender identity.            Thank you!     Thank you for choosing [facility]  for your care. Our goal is always to provide you with excellent care. Hearing back from our patients is one way we can continue to improve our services. Please take a few minutes to complete the written survey that you may receive in the mail after your visit with us. Thank you!             Your Updated Medication List - Protect others around you: Learn how to safely use, store and throw away your medicines at www.disposemymeds.org.          This list is accurate as of: 11/9/17  3:01 PM.  Always use your most recent med list.                   Brand Name Dispense Instructions for use Diagnosis    albuterol 108 (90 BASE) MCG/ACT Inhaler    PROAIR HFA    1 Inhaler    1-2 puffs Every 4-6 hours as needed for shortness of breath. Max 12 puffs/ day.    Moderate persistent asthma without complication       amLODIPine 10 MG tablet    NORVASC    30 tablet    Take 1 tablet (10 mg) by mouth daily    Moderate persistent asthma without complication       aspirin 325 MG tablet     180 tablet    Take 1 tablet (325 mg) by mouth daily    Encounter for medication refill       DULoxetine 20 MG EC capsule    CYMBALTA    30 capsule    Take 1 capsule (20 mg) by mouth daily    Major depressive disorder, recurrent episode, moderate (H)       hydrochlorothiazide 25 MG tablet     HYDRODIURIL    90 tablet    Take 1 tablet (25 mg) by mouth daily    Essential hypertension, benign       ipratropium - albuterol 0.5 mg/2.5 mg/3 mL 0.5-2.5 (3) MG/3ML neb solution    DUONEB    30 vial    Take 1 vial (3 mLs) by nebulization every 6 hours as needed    Persistent asthma with undetermined severity       lisinopril 40 MG tablet    PRINIVIL/ZESTRIL    30 tablet    Take 1 tablet (40 mg) by mouth daily    Essential hypertension with goal blood pressure less than 140/90       loratadine 10 MG tablet    CLARITIN    30 tablet    Take 1 tablet (10 mg) by mouth daily    Encounter for medication refill       medroxyPROGESTERone 150 MG/ML injection    DEPO-PROVERA    1 mL    Inject 1 mL (150 mg) into the muscle every 3 months    Encounter for surveillance of injectable contraceptive       mometasone-formoterol 200-5 MCG/ACT oral inhaler    DULERA    13 Inhaler    Inhale 2 puffs into the lungs 2 times daily    Moderate persistent asthma without complication       omeprazole 20 MG CR capsule    priLOSEC    30 capsule    Take 1 capsule (20 mg) by mouth daily as needed    Severe persistent asthma with acute exacerbation       order for DME     1 each    Please check your blood pressure daily    Essential hypertension, benign       oxyCODONE IR 10 MG tablet    ROXICODONE     Prescribed by [facility]. Takes 1 tablet by mouth 4-6 times per day.    Myofascial pain

## 2017-11-09 NOTE — PROGRESS NOTES
HPI:       Jennifer Barraza is a 32 year old  female with a significant past medical history of hypertension who presents for follow up of concern(s) listed below    1. High Blood pressure: She was prescribed HCTZ 25mg at last visit,  But did not  until 11/3/17 and has only taken the medication twice since our last visit. She did not yet take it today. She feels if she takes all 3 pills for her blood pressure at once she gets nauseated. She does have a headache, no blurry vision, no epistaxis, no chest pains, but some shortness of breath with a history of asthma    2. Asthma: ACT is 13 today,w as 8 at last visit, she admits to having some shortness of breath. She does smoke. She smokes in her bedroom with her bedroom door and window closed and she feels this contributes to her shortness of breath.    3. Pain clinic: 11-17-17. Going to [facility] for her chronic low back pain         PMHX:     Patient Active Problem List   Diagnosis     Moyamoya disease     Tobacco use disorder     Migraine     Lumbago     Vitamin D deficiency     History of angina pectoris     Health Care Home     Lump or mass in breast     Lesion of lower extremity     Possible secondary hypertension     Severe persistent asthma     Partial seizures (H)     Weakness of left side of body     Major depressive disorder, recurrent, severe without psychotic features (H)     Depo-Provera contraceptive status       Current Outpatient Prescriptions   Medication Sig Dispense Refill     DULoxetine (CYMBALTA) 20 MG EC capsule Take 1 capsule (20 mg) by mouth daily 30 capsule 3     order for DME Please check your blood pressure daily 1 each 0     hydrochlorothiazide (HYDRODIURIL) 25 MG tablet Take 1 tablet (25 mg) by mouth daily 90 tablet 1     amLODIPine (NORVASC) 10 MG tablet Take 1 tablet (10 mg) by mouth daily 30 tablet 0     lisinopril (PRINIVIL/ZESTRIL) 40 MG tablet Take 1 tablet (40 mg) by mouth daily 30 tablet 0      ipratropium - albuterol 0.5 mg/2.5 mg/3 mL (DUONEB) 0.5-2.5 (3) MG/3ML neb solution Take 1 vial (3 mLs) by nebulization every 6 hours as needed 30 vial 1     medroxyPROGESTERone (DEPO-PROVERA) 150 MG/ML injection Inject 1 mL (150 mg) into the muscle every 3 months 1 mL 0     aspirin 325 MG tablet Take 1 tablet (325 mg) by mouth daily 180 tablet 0     omeprazole (PRILOSEC) 20 MG CR capsule Take 1 capsule (20 mg) by mouth daily as needed 30 capsule 3     loratadine (CLARITIN) 10 MG tablet Take 1 tablet (10 mg) by mouth daily 30 tablet 3     mometasone-formoterol (DULERA) 200-5 MCG/ACT oral inhaler Inhale 2 puffs into the lungs 2 times daily 13 Inhaler 11     albuterol (ALBUTEROL) 108 (90 BASE) MCG/ACT inhaler 1-2 puffs Every 4-6 hours as needed for shortness of breath. Max 12 puffs/ day. 1 Inhaler 5     oxyCODONE (ROXICODONE) 10 MG IR tablet Prescribed by [facility]. Takes 1 tablet by mouth 4-6 times per day.         Social History     Social History     Marital status: Single     Spouse name: N/A     Number of children: N/A     Years of education: N/A     Occupational History     Not on file.     Social History Main Topics     Smoking status: Current Every Day Smoker     Packs/day: 0.50     Types: Cigarettes     Smokeless tobacco: Never Used      Comment: About 12 cigarettes a day, but patient is trying to quit.      Alcohol use No     Drug use: No     Sexual activity: Not on file     Other Topics Concern     Not on file     Social History Narrative          Allergies   Allergen Reactions     Bee Venom Anaphylaxis     Doesn't have epi pen now.     Ativan      Bupropion Other (See Comments)     No reaction known.  Seizure history, do not use.     Diphenhydramine      Doxylamine Other (See Comments)     Eggs [Chicken-Derived Products (Egg)]      Lorazepam Other (See Comments)     Delusional thoughts     Pear      Unisom [Doxylamine Succinate]      Vicodin [Hydrocodone-Acetaminophen] Itching     Ok on plain  "acetaminophen     Wellbutrin [Bupropion Hydrobromide]      No reaction known.  Seizure history, do not use.       Results for orders placed or performed in visit on 11/09/17 (from the past 24 hour(s))   Basic Metabolic Panel ([facility]) - Results < 1 hr   Result Value Ref Range    Glucose 79.0 60.0 - 109.0 mg/dL    Urea Nitrogen 6.0 5.0 - 24.0 mg/dL    Creatinine 1.0 0.6 - 1.3 mg/dL    Sodium 141.0 133.0 - 144.0 mmol/L    Potassium 3.7 3.4 - 5.3 mmol/L    Chloride 104.0 94.0 - 109.0 mmol/L    Carbon Dioxide 27.0 20.0 - 32.0 mmol/L    Calcium 9.2 8.5 - 10.4 mg/dL    eGFR Calculated (Non Black Reference) 67.9 >60.0 mL/min    eGFR Calculated (Black Reference) 82.1 >60.0 mL/min   CBC with Plt ([facility])   Result Value Ref Range    WBC 5.5 4.0 - 11.0 K/uL    RBC 4.14 3.80 - 5.20 M/uL    Hemoglobin 11.4 (L) 11.7 - 15.7 g/dL    Hematocrit 37.5 35.0 - 47.0 %    MCV 90.7 78.0 - 100.0 fL    MCH 27.5 26.5 - 35.0 pg    MCHC 30.4 (L) 32.0 - 36.0 g/dL    Platelets 190.0 150.0 - 450.0 K/uL            Review of Systems:   C: NEGATIVE for fatigue, unexpected change in weight  E: NEGATIVE for acute vision problems or changes, no epistaxis  CV: NEGATIVE for chest pain, palpitations or new or worsening peripheral edema  PSYCHIATRIC: Feeling stressed          Physical Exam:     Vitals:    11/09/17 1410   BP: (!) 217/138   Pulse: 93   Resp: 16   Temp: 98.1  F (36.7  C)   TempSrc: Oral   SpO2: 100%   Weight: 147 lb (66.7 kg)   Height: 5' 0.5\" (153.7 cm)     Body mass index is 28.24 kg/(m^2).    GENERAL APPEARANCE: healthy, alert and mild distress due to pain,  HENT: ear canals and TM's normal and nose and mouth without ulcers or lesions  NECK: no adenopathy, no asymmetry, masses, or scars and thyroid normal to palpation  RESP: lungs clear to auscultation - no rales, rhonchi or wheezes  CV: regular rate and rhythm,  and no murmur, click,  rub or gallop  MS: extremities normal- no gross deformities noted, no lower extremity edema, having some " tenderness to palpation of lower extremity      Assessment and Plan     1. Essential hypertension, benign  She had not been routinely taking the new blood pressure medication, she has only taken the HCTZ twice since her last visit  - order for DME; Please check your blood pressure daily  Dispense: 1 each; Refill: 0  - Echocardiogram Complete  - TSH  Sensitive ([facility])  - CBC with Plt ([facility])  Return to clinic in 11-27-17 to review blood pressure control    2. Possible secondary hypertension  - Consider 24 hour urine collection, but seems logistically difficult at this time  - Basic Metabolic Panel ([facility]) - Results < 1 hr  - CBC with Plt ([facility])  - EKG 12-lead complete w/read - Clinics      4. Moderate persistent asthma without complication  -Continue same medications for now, recommended she quit smoking or open window in the room she smokes  - Asthma Action Plan (AAP)    5. Major depressive disorder, recurrent episode, moderate (H)    Patient was agreeable to starting medication after our discussion of risk vs. benefit and possible adverse side effects  - DULoxetine (CYMBALTA) 20 MG EC capsule; Take 1 capsule (20 mg) by mouth daily  Dispense: 30 capsule; Refill: 3    She has a pain clinic appt scheduled for 11-17-17.    Options for treatment and follow-up care were reviewed with the patient and/or guardian. Jennifer Barraza and/or guardian engaged in the decision making process and verbalized understanding of the options discussed and agreed with the final plan.    Chayo Zaidi DO

## 2017-11-10 ASSESSMENT — ASTHMA QUESTIONNAIRES: ACT_TOTALSCORE: 13

## 2017-11-15 ENCOUNTER — HOSPITAL ENCOUNTER (OUTPATIENT)
Dept: CARDIOLOGY | Facility: CLINIC | Age: 32
Discharge: HOME | End: 2017-11-15
Attending: FAMILY MEDICINE

## 2017-11-15 DIAGNOSIS — I10 HYPERTENSION: ICD-10-CM

## 2017-11-15 LAB
AORTIC ROOT: 2.7 CM
AORTIC VALVE MEAN VELOCITY: 82.9 CM/S
ASCENDING AORTA: 2.9 CM
AV DIMENSIONLESS INDEX VTI: 0.8
AV MEAN GRADIENT: 3 MMHG
AV PEAK GRADIENT: 6.3 MMHG
AV VALVE AREA: 2.1 CM2
AV VELOCITY RATIO: 0.7
BSA FOR ECHO PROCEDURE: 1.73 M2
CV BLOOD PRESSURE: NORMAL MMHG
CV ECHO HEIGHT: 61 IN
CV ECHO WEIGHT: 153 LBS
DOP CALC AO PEAK VEL: 125 CM/S
DOP CALC AO VTI: 18.2 CM
DOP CALC LVOT AREA: 2.54 CM2
DOP CALC LVOT DIAMETER: 1.8 CM
DOP CALC LVOT PEAK VEL: 92.8 CM/S
DOP CALC LVOT STROKE VOLUME: 37.6 CM3
DOP CALCLVOT PEAK VEL VTI: 14.8 CM
EJECTION FRACTION: 64 % (ref 55–75)
FRACTIONAL SHORTENING: 41 % (ref 28–44)
INTERVENTRICULAR SEPTUM IN END DIASTOLE: 1.4 CM (ref 0.6–0.9)
IVS/PW RATIO: 0.9
LA AREA 1: 28.2 CM2
LA AREA 2: 23.1 CM2
LEFT ATRIUM LENGTH: 5.9 CM
LEFT ATRIUM SIZE: 4.3 CM
LEFT ATRIUM VOLUME INDEX: 54.2 ML/M2
LEFT ATRIUM VOLUME: 93.8 CM3
LEFT VENTRICLE DIASTOLIC VOLUME INDEX: 42.2 CM3/M2 (ref 34–74)
LEFT VENTRICLE DIASTOLIC VOLUME: 73 CM3 (ref 46–106)
LEFT VENTRICLE MASS INDEX: 123 G/M2
LEFT VENTRICLE SYSTOLIC VOLUME INDEX: 15 CM3/M2 (ref 11–31)
LEFT VENTRICLE SYSTOLIC VOLUME: 26 CM3 (ref 14–42)
LEFT VENTRICULAR INTERNAL DIMENSION IN DIASTOLE: 3.9 CM (ref 3.8–5.2)
LEFT VENTRICULAR INTERNAL DIMENSION IN SYSTOLE: 2.3 CM (ref 2.2–3.5)
LEFT VENTRICULAR MASS: 212.9 G
LEFT VENTRICULAR OUTFLOW TRACT MEAN GRADIENT: 2 MMHG
LEFT VENTRICULAR OUTFLOW TRACT MEAN VELOCITY: 59.4 CM/S
LEFT VENTRICULAR OUTFLOW TRACT PEAK GRADIENT: 3 MMHG
LEFT VENTRICULAR POSTERIOR WALL IN END DIASTOLE: 1.5 CM (ref 0.6–0.9)
LV STROKE VOLUME INDEX: 21.8 ML/M2
MITRAL REGURGITANT VELOCITY TIME INTEGRAL: 195 CM
MITRAL VALVE E/A RATIO: 1.1
MR FLOW: 59 CM3
MR MEAN GRADIENT: 116 MMHG
MR MEAN VELOCITY: 496 CM/S
MR PEAK GRADIENT: 191 MMHG
MR PISA EROA: 0.3 CM2
MR PISA RADIUS: 1 CM
MR PISA VN NYQUIST: 33.5 CM/S
MV AVERAGE E/E' RATIO: 32.7 CM/S
MV DECELERATION TIME: 173 MS
MV E'TISSUE VEL-LAT: 5.95 CM/S
MV E'TISSUE VEL-MED: 7.51 CM/S
MV LATERAL E/E' RATIO: 37
MV MEDIAL E/E' RATIO: 29.3
MV PEAK A VELOCITY: 200 CM/S
MV PEAK E VELOCITY: 220 CM/S
MV REGURGITANT VOLUME: 59.4 CC
NUC REST DIASTOLIC VOLUME INDEX: 2448 LBS
NUC REST SYSTOLIC VOLUME INDEX: 60.98 IN
PISA MR PEAK VEL: 691 CM/S
TRICUSPID REGURGITATION PEAK PRESSURE GRADIENT: 45.7 MMHG
TRICUSPID VALVE ANULAR PLANE SYSTOLIC EXCURSION: 1.8 CM
TRICUSPID VALVE PEAK REGURGITANT VELOCITY: 338 CM/S

## 2017-11-15 ASSESSMENT — MIFFLIN-ST. JEOR: SCORE: 1316.12

## 2018-01-04 DIAGNOSIS — J45.40 MODERATE PERSISTENT ASTHMA WITHOUT COMPLICATION: ICD-10-CM

## 2018-01-04 DIAGNOSIS — J45.998 PERSISTENT ASTHMA WITH UNDETERMINED SEVERITY: ICD-10-CM

## 2018-01-07 RX ORDER — IPRATROPIUM BROMIDE AND ALBUTEROL SULFATE 2.5; .5 MG/3ML; MG/3ML
3 SOLUTION RESPIRATORY (INHALATION) EVERY 6 HOURS PRN
Qty: 30 VIAL | Refills: 3 | Status: SHIPPED | OUTPATIENT
Start: 2018-01-07

## 2018-01-07 RX ORDER — ALBUTEROL SULFATE 90 UG/1
AEROSOL, METERED RESPIRATORY (INHALATION)
Qty: 18 INHALER | Refills: 3 | Status: SHIPPED | OUTPATIENT
Start: 2018-01-07 | End: 2018-04-03

## 2018-02-12 ENCOUNTER — OFFICE VISIT (OUTPATIENT)
Dept: FAMILY MEDICINE | Facility: CLINIC | Age: 33
End: 2018-02-12
Payer: COMMERCIAL

## 2018-02-12 VITALS
RESPIRATION RATE: 20 BRPM | DIASTOLIC BLOOD PRESSURE: 71 MMHG | BODY MASS INDEX: 25.71 KG/M2 | SYSTOLIC BLOOD PRESSURE: 114 MMHG | WEIGHT: 136.2 LBS | HEIGHT: 61 IN | HEART RATE: 72 BPM | OXYGEN SATURATION: 100 % | TEMPERATURE: 98.4 F

## 2018-02-12 DIAGNOSIS — Z30.42 ENCOUNTER FOR SURVEILLANCE OF INJECTABLE CONTRACEPTIVE: ICD-10-CM

## 2018-02-12 DIAGNOSIS — I50.31 ACUTE DIASTOLIC CONGESTIVE HEART FAILURE (H): Primary | ICD-10-CM

## 2018-02-12 DIAGNOSIS — Z09 HOSPITAL DISCHARGE FOLLOW-UP: ICD-10-CM

## 2018-02-12 DIAGNOSIS — I10 ESSENTIAL HYPERTENSION, BENIGN: ICD-10-CM

## 2018-02-12 DIAGNOSIS — F17.200 TOBACCO USE DISORDER: ICD-10-CM

## 2018-02-12 DIAGNOSIS — F33.2 MAJOR DEPRESSIVE DISORDER, RECURRENT, SEVERE WITHOUT PSYCHOTIC FEATURES (H): ICD-10-CM

## 2018-02-12 DIAGNOSIS — R56.9 PARTIAL SEIZURES (H): ICD-10-CM

## 2018-02-12 DIAGNOSIS — I67.5 MOYAMOYA DISEASE: ICD-10-CM

## 2018-02-12 DIAGNOSIS — R53.1 WEAKNESS OF LEFT SIDE OF BODY: ICD-10-CM

## 2018-02-12 DIAGNOSIS — Z30.42 DEPO-PROVERA CONTRACEPTIVE STATUS: ICD-10-CM

## 2018-02-12 LAB
ANION GAP SERPL CALCULATED.3IONS-SCNC: 10 MMOL/L (ref 5–18)
BUN SERPL-MCNC: 10 MG/DL (ref 8–22)
CALCIUM SERPL-MCNC: 9.5 MG/DL (ref 8.5–10.5)
CHLORIDE SERPL-SCNC: 105 MMOL/L (ref 98–107)
CO2 SERPL-SCNC: 24 MMOL/L (ref 22–31)
CREAT SERPL-MCNC: 0.87 MG/DL (ref 0.6–1.1)
GLUCOSE SERPL-MCNC: 81 MG/DL (ref 70–125)
HCG UR QL: NEGATIVE
POTASSIUM SERPL-SCNC: 4.8 MMOL/L (ref 3.5–5)
SODIUM SERPL-SCNC: 139 MMOL/L (ref 136–145)

## 2018-02-12 RX ORDER — SPIRONOLACTONE 50 MG/1
50 TABLET, FILM COATED ORAL DAILY
Qty: 30 TABLET | Refills: 1 | Status: SHIPPED | OUTPATIENT
Start: 2018-02-12 | End: 2018-04-25

## 2018-02-12 RX ORDER — FUROSEMIDE 40 MG
40 TABLET ORAL DAILY
Qty: 30 TABLET | Refills: 1 | Status: SHIPPED | OUTPATIENT
Start: 2018-02-12 | End: 2018-04-25

## 2018-02-12 RX ORDER — VENLAFAXINE HYDROCHLORIDE 75 MG/1
75 CAPSULE, EXTENDED RELEASE ORAL DAILY
Qty: 30 CAPSULE | Refills: 1 | Status: SHIPPED | OUTPATIENT
Start: 2018-02-12 | End: 2018-04-11 | Stop reason: SINTOL

## 2018-02-12 RX ORDER — CARVEDILOL 25 MG/1
25 TABLET ORAL 2 TIMES DAILY
Qty: 60 TABLET | Refills: 1 | Status: SHIPPED | OUTPATIENT
Start: 2018-02-12 | End: 2018-04-25

## 2018-02-12 RX ORDER — FUROSEMIDE 40 MG
40 TABLET ORAL DAILY
COMMUNITY
Start: 2018-02-02 | End: 2018-02-12

## 2018-02-12 RX ORDER — ONDANSETRON 4 MG/1
4 TABLET, ORALLY DISINTEGRATING ORAL PRN
COMMUNITY
Start: 2018-02-01 | End: 2018-04-11

## 2018-02-12 RX ORDER — ERGOCALCIFEROL 1.25 MG/1
50000 CAPSULE, LIQUID FILLED ORAL
COMMUNITY
End: 2018-04-11

## 2018-02-12 RX ORDER — MEDROXYPROGESTERONE ACETATE 150 MG/ML
150 INJECTION, SUSPENSION INTRAMUSCULAR
Qty: 1 ML | Refills: 0 | OUTPATIENT
Start: 2018-02-12 | End: 2018-02-12

## 2018-02-12 RX ORDER — SPIRONOLACTONE 50 MG/1
50 TABLET, FILM COATED ORAL DAILY
COMMUNITY
Start: 2018-02-02 | End: 2018-02-12

## 2018-02-12 RX ORDER — CARVEDILOL 25 MG/1
25 TABLET ORAL 2 TIMES DAILY
COMMUNITY
Start: 2018-02-01 | End: 2018-02-12

## 2018-02-12 NOTE — MR AVS SNAPSHOT
After Visit Summary   2/12/2018    Jennifer Barraza    MRN: [id number]           Patient Information     Date Of Birth          1985        Visit Information        Provider Department      2/12/2018 4:00 PM Navi Hall MD [facility]        Today's Diagnoses     Acute diastolic congestive heart failure (H)    -  1    Hospital discharge follow-up        Major depressive disorder, recurrent, severe without psychotic features (H)        Essential hypertension, benign        Moyamoya disease        Partial seizures (H)        Weakness of left side of body        Tobacco use disorder        Depo-Provera contraceptive status        Encounter for surveillance of injectable contraceptive           Follow-ups after your visit        Additional Services     NEUROLOGY ADULT REFERRAL       Reason for Referral: History of partial complex seizures, left sided weakness, and Reid-Reid. Used to see Dr. Bo, but wants a new neurologist. Not currently on anti-epileptics    Referral Location: [facility]     needed: No  Language: English    May leave message on voicemail: Yes    ([facility] Only) Referral should be tracked (Yes)?                  Follow-up notes from your care team     Return in about 4 weeks (around 3/12/2018).      Who to contact     Please call your clinic at [phone] to:    Ask questions about your health    Make or cancel appointments    Discuss your medicines    Learn about your test results    Speak to your doctor            Additional Information About Your Visit        MyChart Information     [employer] is an electronic gateway that provides easy, online access to your medical records. With [employer], you can request a clinic appointment, read your test results, renew a prescription or communicate with your care team.     To sign up for [employer] visit the website at [email or web address]   You will be asked to enter the access code listed below,  "as well as some personal information. Please follow the directions to create your username and password.     Your access code is: [email or web address]  Expires: 2018  1:16 PM     Your access code will  in 90 days. If you need help or a new code, please contact your [facility] or call [phone] for assistance.        Care EveryWhere ID     This is your Care EveryWhere ID. This could be used by other organizations to access your [place] medical records  [id number]        Your Vitals Were     Pulse Temperature Respirations Height Pulse Oximetry BMI (Body Mass Index)    72 98.4  F (36.9  C) (Oral) 20 5' 1\" (154.9 cm) 100% 25.73 kg/m2       Blood Pressure from Last 3 Encounters:   18 114/71   17 (!) 217/138   10/26/17 (!) 189/132    Weight from Last 3 Encounters:   18 136 lb 3.2 oz (61.8 kg)   17 147 lb (66.7 kg)   10/26/17 146 lb (66.2 kg)              We Performed the Following     Basic Metabolic Profile ([employer]) - Results > 1 hr     HCG Qualitative Urine (UPT)  ([facility])     INJECTION INTRAMUSCULAR OR SUB-Q     medroxyPROGESTERone (DEPO-PROVERA) injection 150 mg (Char[facility])          Today's Medication Changes          These changes are accurate as of 18 11:59 PM.  If you have any questions, ask your nurse or doctor.               Start taking these medicines.        Dose/Directions    medroxyPROGESTERone 150 MG/ML injection   Commonly known as:  DEPO-PROVERA   Used for:  Encounter for surveillance of injectable contraceptive   Started by:  Navi Hall MD        Dose:  150 mg   Start taking on:  2018   Inject 1 mL (150 mg) into the muscle every 3 months for 22 days   Quantity:  1 mL   Refills:  0       varenicline 0.5 MG X 11 & 1 MG X 42 tablet   Commonly known as:  CHANTIX STARTING MONTH    Started by:  Navi Hall MD        Take 0.5 mg tab daily for 3 days, then 0.5 mg tab twice daily for 4 days, then 1 mg twice " daily.   Quantity:  53 tablet   Refills:  0       venlafaxine 75 MG 24 hr capsule   Commonly known as:  EFFEXOR-XR   Started by:  Navi Hall MD        Dose:  75 mg   Take 1 capsule (75 mg) by mouth daily   Quantity:  30 capsule   Refills:  1         Stop taking these medicines if you haven't already. Please contact your care team if you have questions.     DULoxetine 20 MG EC capsule   Commonly known as:  CYMBALTA   Stopped by:  Navi Hall MD           hydrochlorothiazide 25 MG tablet   Commonly known as:  HYDRODIURIL   Stopped by:  Navi Hall MD           loratadine 10 MG tablet   Commonly known as:  CLARITIN   Stopped by:  Navi Hall MD                Where to get your medicines      These medications were sent to [facility] [address] [place] [address] AT [address], [facility] [address]     Phone:  [phone]     carvedilol 25 MG tablet    furosemide 40 MG tablet    spironolactone 50 MG tablet    varenicline 0.5 MG X 11 & 1 MG X 42 tablet    venlafaxine 75 MG 24 hr capsule         Some of these will need a paper prescription and others can be bought over the counter.  Ask your nurse if you have questions.     You don't need a prescription for these medications     medroxyPROGESTERone 150 MG/ML injection                Primary Care Provider Office Phone # Fax #    Navi Hall [phone]       [facility] [address] [place] [address]        Equal Access to Services     [facility] Center[facility]AYE AH: Hadii miguel ku hadasho Soomaali, waaxda luqadaha, qaybta kaalmada adeegyajael, toyin idikobe crespo. So [facility] [phone].    ATENCIÓN: Si habla español, tiene a hrenandez disposición servicios gratuitos de asistencia lingüística. Ehsan al [phone].    We comply with applicable federal civil rights laws and [place] laws. We do not discriminate on  the basis of race, color, national origin, age, disability, sex, sexual orientation, or gender identity.            Thank you!     Thank you for choosing [facility]  for your care. Our goal is always to provide you with excellent care. Hearing back from our patients is one way we can continue to improve our services. Please take a few minutes to complete the written survey that you may receive in the mail after your visit with us. Thank you!             Your Updated Medication List - Protect others around you: Learn how to safely use, store and throw away your medicines at www.disposemymeds.org.          This list is accurate as of 2/12/18 11:59 PM.  Always use your most recent med list.                   Brand Name Dispense Instructions for use Diagnosis    albuterol 108 (90 BASE) MCG/ACT Inhaler    PROAIR HFA    18 Inhaler    1-2 puffs Every 4-6 hours as needed for shortness of breath. Max 12 puffs/ day.    Moderate persistent asthma without complication       amLODIPine 10 MG tablet    NORVASC    30 tablet    Take 1 tablet (10 mg) by mouth daily    Moderate persistent asthma without complication       aspirin 325 MG tablet     180 tablet    Take 1 tablet (325 mg) by mouth daily    Encounter for medication refill       carvedilol 25 MG tablet    COREG    60 tablet    Take 1 tablet (25 mg) by mouth 2 times daily    Acute diastolic congestive heart failure (H)       furosemide 40 MG tablet    LASIX    30 tablet    Take 1 tablet (40 mg) by mouth daily    Acute diastolic congestive heart failure (H)       ipratropium - albuterol 0.5 mg/2.5 mg/3 mL 0.5-2.5 (3) MG/3ML neb solution    DUONEB    30 vial    Take 1 vial (3 mLs) by nebulization every 6 hours as needed    Persistent asthma with undetermined severity       lisinopril 40 MG tablet    PRINIVIL/ZESTRIL    30 tablet    Take 1 tablet (40 mg) by mouth daily    Essential hypertension with goal blood pressure less than 140/90       medroxyPROGESTERone 150  MG/ML injection   Start taking on:  4/30/2018    DEPO-PROVERA    1 mL    Inject 1 mL (150 mg) into the muscle every 3 months for 22 days    Encounter for surveillance of injectable contraceptive       mometasone-formoterol 200-5 MCG/ACT oral inhaler    DULERA    13 Inhaler    Inhale 2 puffs into the lungs 2 times daily    Moderate persistent asthma without complication       omeprazole 20 MG CR capsule    priLOSEC    30 capsule    Take 1 capsule (20 mg) by mouth daily as needed    Severe persistent asthma with acute exacerbation       ondansetron 4 MG ODT tab    ZOFRAN-ODT     Place 4 mg under the tongue as needed        order for DME     1 each    Please check your blood pressure daily    Essential hypertension, benign       oxyCODONE IR 10 MG tablet    ROXICODONE     Prescribed by [facility]. Takes 1 tablet by mouth 4-6 times per day.    Myofascial pain       spironolactone 50 MG tablet    ALDACTONE    30 tablet    Take 1 tablet (50 mg) by mouth daily    Acute diastolic congestive heart failure (H), Essential hypertension, benign       varenicline 0.5 MG X 11 & 1 MG X 42 tablet    CHANTIX STARTING MONTH BREANNE    53 tablet    Take 0.5 mg tab daily for 3 days, then 0.5 mg tab twice daily for 4 days, then 1 mg twice daily.        venlafaxine 75 MG 24 hr capsule    EFFEXOR-XR    30 capsule    Take 1 capsule (75 mg) by mouth daily        vitamin D 45810 UNIT capsule    ERGOCALCIFEROL     Take 50,000 Units by mouth

## 2018-02-12 NOTE — PROGRESS NOTES
"    Hospitalization Follow-up Visit         HPI       Hospital Follow-up Visit:    Hospital:  [facility]   Date of Admission: 1/26/18  Date of Discharge: 2/1/18  Reason(s) for Admission: Acute diastolic and valvular CHF            Problems taking medications regularly:  None       Post Discharge Medication Reconciliation: discharge medications reconciled and changed, per note/orders (see AVS).       Problems adhering to non-medication therapy:  None       Medications reviewed by: by myself. Findings include see medication.    Summary of hospitalization: Patient was admitted for constellation of symptoms consistent with congestive heart failure.  She had a BNP of 1667 and an echo with preserved ejection fraction.  We proceeded with diuresis, the patient improved symptomatically.  Patient also had right upper quadrant pain and imaging studies are thickened for gallbladder wall thickening.  This was thought to be due to hepatic congestion versus acute cholecystitis pattern.  Surgery was consulted, but they signed off     CareEverywhere information obtained and reviewed  Diagnostic Tests/Treatments reviewed.  Follow up needed: BMP    Other Healthcare Providers Involved in Patient s Care:         None  Update since discharge: stable.   Plan of care communicated with patient                       Review of Systems:   CONSTITUTIONAL: + fatigue, continues to lose weight   SKIN: no worrisome rashes, no worrisome moles, no worrisome lesions  EYES: no acute vision problems or changes  ENT: no ear problems, no mouth problems, no throat problems  RESP: no significant cough, no shortness of breath  CV: no chest pain, no palpitations, no new or worsening peripheral edema  GI: no nausea, no vomiting, no constipation, no diarrhea            Physical Exam:     Vitals:    02/12/18 1609   BP: 114/71   Pulse: 72   Resp: 20   Temp: 98.4  F (36.9  C)   TempSrc: Oral   SpO2: 100%   Weight: 136 lb 3.2 oz (61.8 kg)   Height: 5' 1\" " (154.9 cm)     Body mass index is 25.73 kg/(m^2).  GENERAL APPEARANCE: healthy, alert and no distress  EYES: Eyes grossly normal to inspection, PERRL and conjunctivae and sclerae normal, fundoscopic exam normal  NECK: no adenopathy, no asymmetry, masses, or scars and thyroid normal to palpation  RESP: lungs clear to auscultation - no rales, rhonchi or wheezes  CV: regular rate and rhythm, normal S1 S2, no S3 or S4, no murmur, click or rub, no peripheral edema and peripheral pulses strong, no heaves or thrills  ABDOMEN: soft, nontender, no hepatosplenomegaly, no masses and bowel sounds normal  MS: no musculoskeletal defects are noted and gait is age appropriate without ataxia  SKIN: no suspicious lesions or rashes  NEURO: Normal strength and tone, sensory exam grossly normal, mentation intact and speech normal  PSYCH: mentation appears normal and affect normal/bright         Results:   Results from the last 24 hours No results found for this or any previous visit (from the past 24 hour(s)).    Assessment and Plan      Jennifer was seen today for hospital f/u, medication reconciliation, refill request and imm/inj.    Diagnoses and all orders for this visit:    Depo-Provera contraceptive status  -     HCG Qualitative Urine (UPT)  ([facility])        1.  Acute diastolic heart failure with preserved ejection fraction  -Patient has follow-up appointments with  [facility] cardiology  -Continue Lasix, spironolactone, Coreg, lisinopril, and amlodipine  -Discontinued hydrochlorothiazide  -This regimen has produced great blood pressure control, which is been very difficult for this patient    2.  Seizures secondary to history of moyamoya  -Patient states that she has been having increased left-sided weakness  -Has not seen neurologist in years  -Refer placed    E&M code to be billed if TCM cannot be: 73806    Type of decision making: High complexity ([address])    Options for treatment and follow-up care were reviewed with the patient  Jennifer  Ammy Barraza   engaged in the decision making process and verbalized understanding of the options discussed and agreed with the final plan.      Navi Hall MD    Patient precepted with Dr. Cloud

## 2018-02-12 NOTE — LETTER
February 16, 2018        Jennifer Barraza  [address]  [place] [address]        Dear Jennifer,    Please see below for your test results.None  Primary Ins:   [employer]  MRN:   [id number]  MyChart:   Code Exp  Next Appt w/Me:   None    Message  Received: Yesterday       Navi Hall MD Vang, Koua X, MEDARDO                     Please send a letter for these results. The patient may continue her current plan of care.     Hi Jennifer,     Your kidney function is normal. Keep taking the medications as previously.       Navi Hall MD                Associated Results         Basic Metabolic Profile ([employer]) - Results > 1 hr   Status:  Final result   Visible to patient:  No (Inaccessible in MyChart)   Dx:  Hospital discharge follow-up Order: [id number]       Notes Recorded by Navi Hall MD on 2/15/2018 at 9:39 PM  Please send a letter for these results. The patient may continue her current plan of care.    Hi Jennifer,    Your kidney function is normal. Keep taking the medications as previously.      Navi Hall MD         Resulted Orders   HCG Qualitative Urine (UPT)  ([facility])   Result Value Ref Range    HCG Qual Urine NEGATIVE Negative   Basic Metabolic Profile ([facility]) - Results > 1 hr   Result Value Ref Range    Sodium 139 136 - 145 mmol/L    Potassium 4.8 3.5 - 5.0 mmol/L    Chloride 105 98 - 107 mmol/L    CO2, Total 24 22 - 31 mmol/L    Anion Gap 10 5 - 18 mmol/L    Glucose 81 70 - 125 mg/dL    Calcium 9.5 8.5 - 10.5 mg/dL    Urea Nitrogen 10 8 - 22 mg/dL    Creatinine 0.87 0.60 - 1.10 mg/dL    GFR Estimate If Black >60 >60 mL/min/1.73m2    GFR Estimate >60 >60 mL/min/1.73m2    Narrative    Test performed by:  [facility]  [address] [place] [address]  Fasting Glucose reference range is 70-99 mg/dL per  American Diabetes Association (ADA) guidelines.       If you have any questions, please call the clinic to make an  appointment.    Sincerely,    Navi Hall MD

## 2018-02-12 NOTE — NURSING NOTE
BP: 114/71    LAST PAP/EXAM: No results found for: PAP  URINE HCG:negative    The following medication was given:     MEDICATION: Depo Provera 150mg  ROUTE: IM  SITE: Deltoid - Left  : Magalie  LOT #: [id number]  EXP:04/2019  NEXT INJECTION DUE: 4/30/18 - 5/122/18   Provider on site during injection: Dr. Cloud  Ordering Provider: Dr. Hall  Person who administered medication: Cong Otero CMA

## 2018-02-12 NOTE — PROGRESS NOTES
Preceptor Attestation:  Patient's case reviewed and discussed with Navi Hall MD Patient seen and discussed with the resident.. I agree with assessment and plan of care.  Supervising Physician:  Arturo Cloud MD  [facility]

## 2018-02-15 RX ORDER — MEDROXYPROGESTERONE ACETATE 150 MG/ML
150 INJECTION, SUSPENSION INTRAMUSCULAR
Qty: 1 ML | Refills: 0 | OUTPATIENT
Start: 2018-04-30 | End: 2018-05-23

## 2018-02-19 NOTE — PATIENT INSTRUCTIONS
Referral for ( TEST )  :      Neurology   LOCATION/PLACE/Provider :    [facility]   DATE & TIME :     To be Determined  PHONE :     [phone]  FAX :     [phone]  ADDITIONAL INFORMATION :     Referral along with OV notes faxed to [facility] and they will contact pt to schedule appointment.  Appointment made by clinic staff/:    Demetrice

## 2018-03-19 ENCOUNTER — TRANSFERRED RECORDS (OUTPATIENT)
Dept: HEALTH INFORMATION MANAGEMENT | Facility: CLINIC | Age: 33
End: 2018-03-19

## 2018-03-20 ENCOUNTER — TRANSFERRED RECORDS (OUTPATIENT)
Dept: HEALTH INFORMATION MANAGEMENT | Facility: CLINIC | Age: 33
End: 2018-03-20

## 2018-04-03 DIAGNOSIS — J45.40 MODERATE PERSISTENT ASTHMA WITHOUT COMPLICATION: ICD-10-CM

## 2018-04-04 RX ORDER — ALBUTEROL SULFATE 90 UG/1
AEROSOL, METERED RESPIRATORY (INHALATION)
Qty: 18 INHALER | Refills: 0 | Status: SHIPPED | OUTPATIENT
Start: 2018-04-04

## 2018-04-05 ENCOUNTER — OFFICE VISIT (OUTPATIENT)
Dept: FAMILY MEDICINE | Facility: CLINIC | Age: 33
End: 2018-04-05
Payer: COMMERCIAL

## 2018-04-05 VITALS
RESPIRATION RATE: 20 BRPM | WEIGHT: 139 LBS | SYSTOLIC BLOOD PRESSURE: 120 MMHG | HEART RATE: 74 BPM | TEMPERATURE: 98.4 F | BODY MASS INDEX: 26.24 KG/M2 | DIASTOLIC BLOOD PRESSURE: 78 MMHG | OXYGEN SATURATION: 99 % | HEIGHT: 61 IN

## 2018-04-05 DIAGNOSIS — I24.0 RCA OCCLUSION (H): Primary | ICD-10-CM

## 2018-04-05 DIAGNOSIS — R07.9 ACUTE CHEST PAIN: ICD-10-CM

## 2018-04-05 PROBLEM — I05.9 PULMONARY HYPERTENSION DUE TO MITRAL VALVE DISEASE (H): Status: ACTIVE | Noted: 2018-04-05

## 2018-04-05 PROBLEM — I34.0 MITRAL REGURGITATION: Status: ACTIVE | Noted: 2018-04-05

## 2018-04-05 PROBLEM — I27.22 PULMONARY HYPERTENSION DUE TO MITRAL VALVE DISEASE (H): Status: ACTIVE | Noted: 2018-04-05

## 2018-04-05 RX ORDER — CLOPIDOGREL BISULFATE 75 MG/1
75 TABLET ORAL
COMMUNITY
Start: 2018-03-20 | End: 2018-06-20

## 2018-04-05 NOTE — PROGRESS NOTES
Hospitalization Follow-up Visit         HPI       Hospital Follow-up Visit:    Hospital:  [facility]   Date of Admission: 3/20/18  Date of Discharge: 3/23/18  Reason(s) for Admission: NSTEMI            Problems taking medications regularly:  None       Post Discharge Medication Reconciliation: discharge medications reconciled, continue medications without change.       Problems adhering to non-medication therapy:  None       Medications reviewed by: by myself.    Summary of hospitalization:  CareEverywhere information obtained and reviewed  Diagnostic Tests/Treatments reviewed.  Follow up needed: Angiogram with stent placement  Other Healthcare Providers Involved in Patient s Care:         Specialist appointment - 5/16/18 cardiology  Update since discharge: stable.   Plan of care communicated with patient    Was hospitalized on March 20th for hypertensive emergency after she discontinued taking hypertension drugs. She was hospitalized and started on nitroprusside drip. During this stay she was found to have NSTEMI with trops of 0.138 and subtotal occlusion of her RCA. She left A due to a panic attack. Since leaving the hospital, she reports ongoing chest and jaw pain with SOB at rest that worsen with mild exertion and improves at rest. Endorses nausea without vomiting. Currently on Plavix.     Also reports left sided arm and leg weakness and heavy sensation that has been present for many years but has been significantly worse since leaving the hospital. It is constant with walking and standing but at rest comes and down. Numbness third and fourth left digits, pins and needles sensation throughout the entire left arm, numbness at left, tingling pain down left leg and foot. No facial droop, difficulty speaking, changes. Has worsening headaches but difficult to distinguish from chronic migraines.                 Review of Systems:   CONSTITUTIONAL: + fatigue, no unexpected change in weight  SKIN: no  "worrisome rashes, no worrisome moles, no worrisome lesions  EYES: no acute vision problems or changes  ENT: no ear problems, no mouth problems, no throat problems  RESP: no significant cough, + shortness of breath  CV: + chest pain, no palpitations, no new or worsening peripheral edema  GI: + nausea, no vomiting, no constipation, no diarrhea            Physical Exam:     Vitals:    04/05/18 1121   BP: 120/78   Pulse: 74   Resp: 20   Temp: 98.4  F (36.9  C)   SpO2: 99%   Weight: 139 lb (63 kg)   Height: 5' 1\" (154.9 cm)     Body mass index is 26.26 kg/(m^2).    GENERAL: healthy, alert, well nourished, well hydrated, no distress  HENT: ear canals- normal; TMs- normal; Nose- normal; Mouth- no ulcers, no lesions  NECK: no tenderness, no adenopathy, no asymmetry, no masses, no stiffness; thyroid- normal to palpation  RESP: lungs clear to auscultation - no rales, no rhonchi, no wheezes  CV: regular rates and rhythm, normal S1 S2, no S3 or S4 and no murmur, no click or rub -  ABDOMEN: soft, no tenderness, no  hepatosplenomegaly, no masses, normal bowel sounds         Results:   none    Assessment and Plan     (I21.11) RCA occlusion (H)  (primary encounter diagnosis)  Comment:   Plan:     (R07.9) Acute chest pain  Comment:   Plan:     Given the information with a near occlusion of her RCA, with significant symptoms at rest and exertion, we discussed the need for emergent in hospital treatment and workup. We recommended angiogram and stent placement with inpatient hospitalization. Patient refused to be hospitalized at this time because there was nobody to take care of her children. We discussed the option of  or familial supervision of her kids, as well as the risk of heart attack, which include irreversible damage to the heart, and possibly death. Patient refused further intervention after in depth discussion of risks and benefits, and she signed a generic AMA form.     L weakness: long history of intermittent L " sided weakness that have been recently worsening likely due to worsening ischemic cardiomyopathy. Otherwise there doesn't seem to be an acute cerebrovascular event. Saw neurology on 3/20/18 with similar symptoms - ordered MRI.    Scribed by Eduard Kong, MS3, for Dr. Hall, PGY3 Resident.     I have reviewed and edited the documentation recorded by the scribe.     Navi Hall MD  Family Medicine Resident, R3    In supervising the medical student, I saw and evaluated the patient with the student and personally performed all aspects of the history and physical.  I have reviewed and verified that the documentation is correct and complete.    E&M code to be billed if TCM cannot be: 47650    Type of decision making: High complexity (17525)    Options for treatment and follow-up care were reviewed with the patient  Jennifer Barraza   engaged in the decision making process and verbalized understanding of the options discussed and agreed with the final plan.      Navi Hall MD    Precepted with Dr. Britt Gonzalez

## 2018-04-05 NOTE — PROGRESS NOTES
"    Hospitalization Follow-up Visit         HPI       Hospital Follow-up Visit:    Hospital:  [facility]   Date of Admission: 3/20/18  Date of Discharge: 3/23/18  Reason(s) for Admission: NSTEMI            Problems taking medications regularly:  {NONE DEFAULTED:450659::\"None\"}       Post Discharge Medication Reconciliation: {ACO Med Rec (Provider):352375}.       Problems adhering to non-medication therapy:  {NONE DEFAULTED:706886::\"None\"}       Medications reviewed by: {[facility] Meds Reviewed:1146248}    Summary of hospitalization:  {HOSPITAL DISCHARGE SUMMARY INFO:590502::\"[facility] hospital discharge summary reviewed\"}  Diagnostic Tests/Treatments reviewed.  Follow up needed: {NONE DEFAULTED:602222::\"none\"}  Other Healthcare Providers Involved in Patient s Care:         {HOSPITAL FOLLOWUP PROVIDER LIST:594369::\"None\"}  Update since discharge: {IMPROVED DEFAULT:311792::\"improved.\"} {include information from family, SNF, care coordination}  Plan of care communicated with {Communicate Plan to:988587::\"patient\"}            {:789689}            Review of Systems:   {ROS normal:446903::\"CONSTITUTIONAL: no fatigue, no unexpected change in weight\",\"SKIN: no worrisome rashes, no worrisome moles, no worrisome lesions\",\"EYES: no acute vision problems or changes\",\"ENT: no ear problems, no mouth problems, no throat problems\",\"RESP: no significant cough, no shortness of breath\",\"CV: no chest pain, no palpitations, no new or worsening peripheral edema\",\"GI: no nausea, no vomiting, no constipation, no diarrhea\"}            Physical Exam:     Vitals:    04/05/18 1121   BP: 120/78   Pulse: 74   Resp: 20   Temp: 98.4  F (36.9  C)   SpO2: 99%   Weight: 139 lb (63 kg)   Height: 5' 1\" (154.9 cm)     Body mass index is 26.26 kg/(m^2).    {EXAM:379965}         Results:   {[facility] Result Choices:6678308}    Assessment and Plan      There are no diagnoses linked to this encounter.    {Assessment and Plan:510748}    E&M code " to be billed if TCM cannot be: {[facility] E&M Codes:5758663}    Type of decision making: {[facility] COMPLEXITY:1385137}  {Delete from note: The table below shows the elements to determine the level of medical decision making.  Note that to qualify for a given type of medical decision making, two of the three elements must be met or exceeded.  Type of decision making # of possible dx and/or management options Amount and/or complexity of data to be reviewed Risk of significant complications, mobidity and/or mortality   Moderate complexity Multiple Moderate Moderate   High complexity Extensive Extensive High   If moderate complexity, then use 49077 as LOS in SmartSet.  If high complexity, use 95190 as LOS in SmartSet.}    Options for treatment and follow-up care were reviewed with the patient  Jennifer Barraza   engaged in the decision making process and verbalized understanding of the options discussed and agreed with the final plan.      Navi Hall MD

## 2018-04-05 NOTE — MR AVS SNAPSHOT
"              After Visit Summary   2018    Jennifer Barraza    MRN: [id number]           Patient Information     Date Of Birth          1985        Visit Information        Provider Department      2018 11:20 AM Navi Hall MD [facility]        Today's Diagnoses     RCA occlusion (H)    -  1    Acute chest pain           Follow-ups after your visit        Who to contact     Please call your clinic at [phone] to:    Ask questions about your health    Make or cancel appointments    Discuss your medicines    Learn about your test results    Speak to your doctor            Additional Information About Your Visit        MyChart Information     [employer] is an electronic gateway that provides easy, online access to your medical records. With [employer], you can request a clinic appointment, read your test results, renew a prescription or communicate with your care team.     To sign up for [employer] visit the website at [email or web address]   You will be asked to enter the access code listed below, as well as some personal information. Please follow the directions to create your username and password.     Your access code is: [email or web address]  Expires: 2018  2:16 PM     Your access code will  in 90 days. If you need help or a new code, please contact your [facility] or call [phone] for assistance.        Care EveryWhere ID     This is your Care EveryWhere ID. This could be used by other organizations to access your [facility] medical records  [id number]        Your Vitals Were     Pulse Temperature Respirations Height Pulse Oximetry BMI (Body Mass Index)    74 98.4  F (36.9  C) 20 5' 1\" (154.9 cm) 99% 26.26 kg/m2       Blood Pressure from Last 3 Encounters:   18 120/78   18 114/71   17 (!) 217/138    Weight from Last 3 Encounters:   18 139 lb (63 kg)   18 136 lb 3.2 oz (61.8 kg)   17 147 lb (66.7 " kg)              Today, you had the following     No orders found for display         Today's Medication Changes          These changes are accurate as of 4/5/18 11:59 PM.  If you have any questions, ask your nurse or doctor.               Stop taking these medicines if you haven't already. Please contact your care team if you have questions.     aspirin 325 MG tablet                    Primary Care Provider Office Phone # Fax #    Navi Mario Hall [phone]       [facility] [address] [place] [address]        Equal Access to Services     [facility]: Hadii aad ku hadasho Soomaali, waaxda luqadaha, qaybta kaalmada adeegyada, waxay rosemariein hayaan gagan haddad . So [facility] [phone].    ATENCIÓN: Si habla español, tiene a hernandez disposición servicios gratuitos de asistencia lingüística. [facility] [phone].    We comply with applicable federal civil rights laws and [place] laws. We do not discriminate on the basis of race, color, national origin, age, disability, sex, sexual orientation, or gender identity.            Thank you!     Thank you for choosing [facility]  for your care. Our goal is always to provide you with excellent care. Hearing back from our patients is one way we can continue to improve our services. Please take a few minutes to complete the written survey that you may receive in the mail after your visit with us. Thank you!             Your Updated Medication List - Protect others around you: Learn how to safely use, store and throw away your medicines at www.disposemymeds.org.          This list is accurate as of 4/5/18 11:59 PM.  Always use your most recent med list.                   Brand Name Dispense Instructions for use Diagnosis    albuterol 108 (90 BASE) MCG/ACT Inhaler    PROAIR HFA    18 Inhaler    1-2 puffs Every 4-6 hours as needed for shortness of breath. Max 12 puffs/ day.    Moderate persistent asthma without  complication       amLODIPine 10 MG tablet    NORVASC    30 tablet    Take 1 tablet (10 mg) by mouth daily    Moderate persistent asthma without complication       carvedilol 25 MG tablet    COREG    60 tablet    Take 1 tablet (25 mg) by mouth 2 times daily    Acute diastolic congestive heart failure (H)       clopidogrel 75 MG tablet    PLAVIX     Take 75 mg by mouth        furosemide 40 MG tablet    LASIX    30 tablet    Take 1 tablet (40 mg) by mouth daily    Acute diastolic congestive heart failure (H)       ipratropium - albuterol 0.5 mg/2.5 mg/3 mL 0.5-2.5 (3) MG/3ML neb solution    DUONEB    30 vial    Take 1 vial (3 mLs) by nebulization every 6 hours as needed    Persistent asthma with undetermined severity       lisinopril 40 MG tablet    PRINIVIL/ZESTRIL    30 tablet    Take 1 tablet (40 mg) by mouth daily    Essential hypertension with goal blood pressure less than 140/90       medroxyPROGESTERone 150 MG/ML injection   Start taking on:  4/30/2018    DEPO-PROVERA    1 mL    Inject 1 mL (150 mg) into the muscle every 3 months for 22 days    Encounter for surveillance of injectable contraceptive       mometasone-formoterol 200-5 MCG/ACT oral inhaler    DULERA    13 Inhaler    Inhale 2 puffs into the lungs 2 times daily    Moderate persistent asthma without complication       omeprazole 20 MG CR capsule    priLOSEC    30 capsule    Take 1 capsule (20 mg) by mouth daily as needed    Severe persistent asthma with acute exacerbation       ondansetron 4 MG ODT tab    ZOFRAN-ODT     Place 4 mg under the tongue as needed        order for DME     1 each    Please check your blood pressure daily    Essential hypertension, benign       oxyCODONE IR 10 MG tablet    ROXICODONE     Prescribed by [facility]. Takes 1 tablet by mouth 4-6 times per day.    Myofascial pain       spironolactone 50 MG tablet    ALDACTONE    30 tablet    Take 1 tablet (50 mg) by mouth daily    Acute diastolic congestive heart  failure (H), Essential hypertension, benign       varenicline 0.5 MG X 11 & 1 MG X 42 tablet    CHANTIX STARTING MONTH PAK    53 tablet    Take 0.5 mg tab daily for 3 days, then 0.5 mg tab twice daily for 4 days, then 1 mg twice daily.        venlafaxine 75 MG 24 hr capsule    EFFEXOR-XR    30 capsule    Take 1 capsule (75 mg) by mouth daily        vitamin D 55387 UNIT capsule    ERGOCALCIFEROL     Take 50,000 Units by mouth

## 2018-04-07 ENCOUNTER — RECORDS - HEALTHEAST (OUTPATIENT)
Dept: ADMINISTRATIVE | Facility: OTHER | Age: 33
End: 2018-04-07

## 2018-04-07 ASSESSMENT — ASTHMA QUESTIONNAIRES: ACT_TOTALSCORE: 15

## 2018-04-09 NOTE — PROGRESS NOTES
Preceptor Attestation:  Patient's case reviewed and discussed with  Patient seen and discussed with the resident.  I agree with written assessment and plan of care.  Supervising Physician:  Britt Gonzalez MD  [facility]

## 2018-04-11 ENCOUNTER — OFFICE VISIT (OUTPATIENT)
Dept: FAMILY MEDICINE | Facility: CLINIC | Age: 33
End: 2018-04-11
Payer: COMMERCIAL

## 2018-04-11 VITALS
RESPIRATION RATE: 20 BRPM | SYSTOLIC BLOOD PRESSURE: 118 MMHG | OXYGEN SATURATION: 97 % | WEIGHT: 137 LBS | HEIGHT: 62 IN | BODY MASS INDEX: 25.21 KG/M2 | TEMPERATURE: 98 F | HEART RATE: 77 BPM | DIASTOLIC BLOOD PRESSURE: 86 MMHG

## 2018-04-11 DIAGNOSIS — M79.7 FIBROMYALGIA: ICD-10-CM

## 2018-04-11 DIAGNOSIS — K21.9 GASTROESOPHAGEAL REFLUX DISEASE, ESOPHAGITIS PRESENCE NOT SPECIFIED: ICD-10-CM

## 2018-04-11 DIAGNOSIS — Z95.5 S/P CORONARY ARTERY STENT PLACEMENT: ICD-10-CM

## 2018-04-11 DIAGNOSIS — I20.0 UNSTABLE ANGINA (H): Primary | ICD-10-CM

## 2018-04-11 PROBLEM — I24.0 RCA OCCLUSION (H): Status: RESOLVED | Noted: 2018-04-05 | Resolved: 2018-04-11

## 2018-04-11 RX ORDER — ASPIRIN 81 MG/1
81 TABLET, CHEWABLE ORAL
COMMUNITY
Start: 2018-04-09 | End: 2018-05-23

## 2018-04-11 RX ORDER — NITROGLYCERIN 0.4 MG/1
0.4 TABLET SUBLINGUAL
COMMUNITY
Start: 2018-04-08

## 2018-04-11 RX ORDER — ATORVASTATIN CALCIUM 20 MG/1
20 TABLET, FILM COATED ORAL
COMMUNITY
Start: 2018-04-08

## 2018-04-11 NOTE — PROGRESS NOTES
"    Hospitalization Follow-up Visit         HPI       Hospital Follow-up Visit:    Hospital:  [facility]   Date of Admission: 4/7/18  Date of Discharge: 4/8/18  Reason(s) for Admission: Unstable Angina            Problems taking medications regularly:  None       Post Discharge Medication Reconciliation: discharge medications reconciled, continue medications without change.       Problems adhering to non-medication therapy:  None       Medications reviewed by: by myself.    Summary of hospitalization:  CareEverywhere information obtained and reviewed  Diagnostic Tests/Treatments reviewed.  Follow up needed: none  Other Healthcare Providers Involved in Patient s Care:         Specialist appointment - May 16th with cardiology, Efrainina  Update since discharge: stable.   Plan of care communicated with patient     Since discharge, is unsure how she is feeling. Hasn't pushed herself, hasn't done her normal daily routine.     Does still have jawline pain, unchanged since angiogram and stent placement. As well as continued left arm pain.    Has heart burn, different than previous chest pain              Review of Systems:   CONSTITUTIONAL: fatigue  SKIN: right arm swelling  EYES: no acute vision problems or changes  ENT: no ear problems, no mouth problems, no throat problems  RESP: mild dyspnea on exertion  CV: no chest pain,   GI: no nausea, no vomiting, no constipation, no diarrhea  : no frequency, no dysuria, no hematuria  MS: no claudication, no myalgias, no joint aches            Physical Exam:     Vitals:    04/11/18 1018   BP: 118/86   Pulse: 77   Resp: 20   Temp: 98  F (36.7  C)   TempSrc: Oral   SpO2: 97%   Weight: 137 lb (62.1 kg)   Height: 5' 1.5\" (156.2 cm)     Body mass index is 25.47 kg/(m^2).    GENERAL: healthy, alert and no distress  EYES: Eyes grossly normal to inspection, extraocular movements - intact  HENT: ear canals- normal; TMs- normal; Nose- normal; Mouth- no ulcers, no abscess, TMJ joint " with painful clicking  NECK: no tenderness, no adenopathy, no asymmetry, no masses, no stiffness; thyroid- normal to palpation  RESP: lungs clear to auscultation - no rales, no rhonchi, no wheezes  CV: regular rates and rhythm, normal S1 S2, no S3 or S4 and no murmur, no click or rub -  ABDOMEN: soft, no tenderness, no  hepatosplenomegaly, no masses, normal bowel sounds  MS: right forearm with nonpitting edema, no erythema, tender to palpation at angio catheter insertion site  SKIN: no suspicious lesions, no rashes  PSYCH: Alert and oriented times 3; speech- coherent , normal rate and volume; able to articulate logical thoughts, able to abstract reason, no tangential thoughts, no hallucinations or delusions, affect- normal  LYMPHATICS: ant. cervical- normal, post. cervical- normal, axillary- normal, supraclavicular- normal, inguinal- normal         Results:   None at today's visit    Assessment and Plan      Jennifer was seen today for hospital f/u, chest pain, musculoskeletal problem, back pain, excessive sweating and medication reconciliation.    Diagnoses and all orders for this visit:    Unstable angina (H)  -     CARDIAC REHAB REFERRAL    S/P coronary artery stent placement  -     CARDIAC REHAB REFERRAL    Gastroesophageal reflux disease, esophagitis presence not specified  -     omeprazole (PRILOSEC) 20 MG CR capsule; Take 1 capsule (20 mg) by mouth every morning (before breakfast)    Fibromyalgia  -     amitriptyline (ELAVIL) 25 MG tablet; Take 1 tablet (25 mg) by mouth At Bedtime    - Patient was never set up with cardiac rehab. This should be instituted about 2 weeks after angiogram, and so an order was placed. I encouraged the patient to call her cardiologist to confirm this, but it will be done through the [facility] system.   - Burning epigastric pain, will restart omeprazole  - Concern for possible fibromyalgia versus TMJ dysfunction. Will start elavil 25mg at bedtime, titrate up to 50mg in 2 weeks. Also,  encouraged dentist follow up (may need 1 tooth extracted) for possible TMJ. If unable to help with this, can refer to ENT.    E&M code to be billed if TCM cannot be: 10973    Options for treatment and follow-up care were reviewed with the patient  Jennifer Barraza   engaged in the decision making process and verbalized understanding of the options discussed and agreed with the final plan.      Navi Hall MD    Precepted with Dr. Hemant Guzmán

## 2018-04-11 NOTE — TELEPHONE ENCOUNTER
The pharmacy indicate that patient is taking Plavix.This medication can reduce the effectiveness of Plavix.Ok to sub to Protonix? Per pharmacy.

## 2018-04-11 NOTE — MR AVS SNAPSHOT
"              After Visit Summary   4/11/2018    Jennifer Barraza    MRN: [id number]           Patient Information     Date Of Birth          1985        Visit Information        Provider Department      4/11/2018 10:00 AM Navi Hall MD [facility]        Today's Diagnoses     Unstable angina (H)    -  1    S/P coronary artery stent placement        Gastroesophageal reflux disease, esophagitis presence not specified        Fibromyalgia          Care Instructions    Referral for Cardiac Rehab faxed to  Cardiac Rehab  [phone]  [phone]  Clinic will contact patient to schedule appointment.          Follow-ups after your visit        Additional Services     CARDIAC REHAB REFERRAL       *This therapy referral will be filtered to a centralized scheduling office at [facility] and the patient will receive a call to schedule an appointment at a [facility] location most convenient for them.*     Please schedule with either  cardiac rehab or [facility]    If you have not heard from the scheduling office within 2 business days, please call [phone] for all locations.    Please be aware that coverage of these services is subject to the terms and limitations of your health insurance plan.  Call member services at your health plan with any benefit or coverage questions.      **Note to Provider:  If you are referring outside of [place] for the therapy appointment, please list the name of the location in the \"special instructions\" above, print the referral and give to the patient to schedule the appointment.                  Follow-up notes from your care team     Return in about 4 weeks (around 5/9/2018).      Who to contact     Please call your clinic at [phone] to:    Ask questions about your health    Make or cancel appointments    Discuss your medicines    Learn about your test results    Speak to your doctor            Additional " "Information About Your Visit        [employer] Information     [employer] is an electronic gateway that provides easy, online access to your medical records. With [employer] you can request a clinic appointment, read your test results, renew a prescription or communicate with your care team.     To sign up for [employer] visit the website at [email or web address]   You will be asked to enter the access code listed below, as well as some personal information. Please follow the directions to create your username and password.     Your access code is: [email or web address]  Expires: 2018  2:16 PM     Your access code will  in 90 days. If you need help or a new code, please contact your [facility] or call [phone] for assistance.        Care EveryWhere ID     This is your Care EveryWhere ID. This could be used by other organizations to access your [place] medical records  [id number]        Your Vitals Were     Pulse Temperature Respirations Height Pulse Oximetry BMI (Body Mass Index)    77 98  F (36.7  C) (Oral) 20 5' 1.5\" (156.2 cm) 97% 25.47 kg/m2       Blood Pressure from Last 3 Encounters:   18 118/86   18 120/78   18 114/71    Weight from Last 3 Encounters:   18 137 lb (62.1 kg)   18 139 lb (63 kg)   18 136 lb 3.2 oz (61.8 kg)              We Performed the Following     CARDIAC REHAB REFERRAL          Today's Medication Changes          These changes are accurate as of 18 11:59 PM.  If you have any questions, ask your nurse or doctor.               Start taking these medicines.        Dose/Directions    amitriptyline 25 MG tablet   Commonly known as:  ELAVIL   Used for:  Fibromyalgia   Started by:  Navi Hall MD        Dose:  25 mg   Take 1 tablet (25 mg) by mouth At Bedtime   Quantity:  60 tablet   Refills:  1       pantoprazole 20 MG EC tablet   Commonly known as:  PROTONIX   Used for:  Gastroesophageal reflux disease, " esophagitis presence not specified   Started by:  Navi Hall MD        Take 20mg (1 tablet) by mouth 30-60 minutes before a meal.   Quantity:  30 tablet   Refills:  3         These medicines have changed or have updated prescriptions.        Dose/Directions    omeprazole 20 MG CR capsule   Commonly known as:  priLOSEC   This may have changed:    - when to take this  - reasons to take this   Used for:  Gastroesophageal reflux disease, esophagitis presence not specified   Changed by:  Navi Hall MD        Dose:  20 mg   Take 1 capsule (20 mg) by mouth every morning (before breakfast)   Quantity:  30 capsule   Refills:  3         Stop taking these medicines if you haven't already. Please contact your care team if you have questions.     ondansetron 4 MG ODT tab   Commonly known as:  ZOFRAN-ODT   Stopped by:  Navi Hall MD           oxyCODONE IR 10 MG tablet   Commonly known as:  ROXICODONE   Stopped by:  Navi Hall MD           varenicline 0.5 MG X 11 & 1 MG X 42 tablet   Commonly known as:  CHANTIX STARTING MONTH BREANNE   Stopped by:  Navi Hall MD           venlafaxine 75 MG 24 hr capsule   Commonly known as:  EFFEXOR-XR   Stopped by:  Navi Hall MD           vitamin D 63156 UNIT capsule   Commonly known as:  ERGOCALCIFEROL   Stopped by:  Navi Hall MD                Where to get your medicines      These medications were sent to [id number] - [place] AT [address], [place] [address]     Phone:  [phone]     amitriptyline 25 MG tablet    omeprazole 20 MG CR capsule    pantoprazole 20 MG EC tablet                Primary Care Provider Office Phone # Fax #    Navi Hall [phone]       [facility] [address] [place] [address]        Equal Access to Services     ELISEO MELENDEZ : Martín  miguel Mazariegos, waruthda luqadaha, qaybta kaalmada jackmele, waxtheresa amy zapatamaricarmenpaula haddad cherie. So [facility] [phone].    ATENCIÓN: Si habla español, tiene a hernandez disposición servicios gratuitos de asistencia lingüística. Ehsan al [phone].    We comply with applicable federal civil rights laws and [place] laws. We do not discriminate on the basis of race, color, national origin, age, disability, sex, sexual orientation, or gender identity.            Thank you!     Thank you for choosing [facility]  for your care. Our goal is always to provide you with excellent care. Hearing back from our patients is one way we can continue to improve our services. Please take a few minutes to complete the written survey that you may receive in the mail after your visit with us. Thank you!             Your Updated Medication List - Protect others around you: Learn how to safely use, store and throw away your medicines at www.disposemymeds.org.          This list is accurate as of 4/11/18 11:59 PM.  Always use your most recent med list.                   Brand Name Dispense Instructions for use Diagnosis    albuterol 108 (90 Base) MCG/ACT Inhaler    PROAIR HFA    18 Inhaler    1-2 puffs Every 4-6 hours as needed for shortness of breath. Max 12 puffs/ day.    Moderate persistent asthma without complication       amitriptyline 25 MG tablet    ELAVIL    60 tablet    Take 1 tablet (25 mg) by mouth At Bedtime    Fibromyalgia       amLODIPine 10 MG tablet    NORVASC    30 tablet    Take 1 tablet (10 mg) by mouth daily    Moderate persistent asthma without complication       aspirin 81 MG chewable tablet      81 mg        atorvastatin 20 MG tablet    LIPITOR     Take 20 mg by mouth        carvedilol 25 MG tablet    COREG    60 tablet    Take 1 tablet (25 mg) by mouth 2 times daily    Acute diastolic congestive heart failure (H)       clopidogrel 75 MG tablet    PLAVIX     Take 75 mg by mouth        furosemide  40 MG tablet    LASIX    30 tablet    Take 1 tablet (40 mg) by mouth daily    Acute diastolic congestive heart failure (H)       ipratropium - albuterol 0.5 mg/2.5 mg/3 mL 0.5-2.5 (3) MG/3ML neb solution    DUONEB    30 vial    Take 1 vial (3 mLs) by nebulization every 6 hours as needed    Persistent asthma with undetermined severity       lisinopril 40 MG tablet    PRINIVIL/ZESTRIL    30 tablet    Take 1 tablet (40 mg) by mouth daily    Essential hypertension with goal blood pressure less than 140/90       medroxyPROGESTERone 150 MG/ML injection   Start taking on:  4/30/2018    DEPO-PROVERA    1 mL    Inject 1 mL (150 mg) into the muscle every 3 months for 22 days    Encounter for surveillance of injectable contraceptive       mometasone-formoterol 200-5 MCG/ACT oral inhaler    DULERA    13 Inhaler    Inhale 2 puffs into the lungs 2 times daily    Moderate persistent asthma without complication       nitroGLYcerin 0.4 MG sublingual tablet    NITROSTAT     Place 0.4 mg under the tongue        omeprazole 20 MG CR capsule    priLOSEC    30 capsule    Take 1 capsule (20 mg) by mouth every morning (before breakfast)    Gastroesophageal reflux disease, esophagitis presence not specified       order for DME     1 each    Please check your blood pressure daily    Essential hypertension, benign       pantoprazole 20 MG EC tablet    PROTONIX    30 tablet    Take 20mg (1 tablet) by mouth 30-60 minutes before a meal.    Gastroesophageal reflux disease, esophagitis presence not specified       spironolactone 50 MG tablet    ALDACTONE    30 tablet    Take 1 tablet (50 mg) by mouth daily    Acute diastolic congestive heart failure (H), Essential hypertension, benign

## 2018-04-11 NOTE — PATIENT INSTRUCTIONS
Referral for Cardiac Rehab faxed to  Cardiac Rehab  [phone]  [phone]  Clinic will contact patient to schedule appointment.

## 2018-04-12 RX ORDER — PANTOPRAZOLE SODIUM 20 MG/1
TABLET, DELAYED RELEASE ORAL
Qty: 30 TABLET | Refills: 3 | Status: SHIPPED | OUTPATIENT
Start: 2018-04-12

## 2018-04-14 ENCOUNTER — HEALTH MAINTENANCE LETTER (OUTPATIENT)
Age: 33
End: 2018-04-14

## 2018-04-18 NOTE — PROGRESS NOTES
Preceptor Attestation:  Patient's case reviewed and discussed with Navi Hall MD resident and I evaluated the patient. I agree with written assessment and plan of care.  Supervising Physician:  Hemant Guzmán MD MD  [facility]

## 2018-04-23 ENCOUNTER — TELEPHONE (OUTPATIENT)
Dept: FAMILY MEDICINE | Facility: CLINIC | Age: 33
End: 2018-04-23

## 2018-04-23 NOTE — TELEPHONE ENCOUNTER
Ventolin HFA was approved in 04/04/18 for 18 inhaler plus no refill and I did verify with Dolores Medrano RN and was told to called in for 1 inhaler plus 18 refill.

## 2018-04-25 ENCOUNTER — AMBULATORY - HEALTHEAST (OUTPATIENT)
Dept: CARDIAC REHAB | Facility: HOSPITAL | Age: 33
End: 2018-04-25

## 2018-04-25 DIAGNOSIS — Z95.5 STENTED CORONARY ARTERY: ICD-10-CM

## 2018-04-25 DIAGNOSIS — I10 ESSENTIAL HYPERTENSION, BENIGN: ICD-10-CM

## 2018-04-25 DIAGNOSIS — I50.31 ACUTE DIASTOLIC CONGESTIVE HEART FAILURE (H): ICD-10-CM

## 2018-04-25 RX ORDER — SPIRONOLACTONE 50 MG/1
50 TABLET, FILM COATED ORAL DAILY
Qty: 30 TABLET | Refills: 11 | Status: SHIPPED | OUTPATIENT
Start: 2018-04-25

## 2018-04-25 RX ORDER — CARVEDILOL 25 MG/1
25 TABLET ORAL 2 TIMES DAILY
Qty: 60 TABLET | Refills: 11 | Status: SHIPPED | OUTPATIENT
Start: 2018-04-25

## 2018-04-25 RX ORDER — FUROSEMIDE 40 MG
40 TABLET ORAL DAILY
Qty: 30 TABLET | Refills: 11 | Status: SHIPPED | OUTPATIENT
Start: 2018-04-25

## 2018-05-10 ENCOUNTER — AMBULATORY - HEALTHEAST (OUTPATIENT)
Dept: CARDIAC REHAB | Facility: HOSPITAL | Age: 33
End: 2018-05-10

## 2018-05-10 DIAGNOSIS — Z95.5 STENTED CORONARY ARTERY: ICD-10-CM

## 2018-05-10 DIAGNOSIS — I21.4 NSTEMI (NON-ST ELEVATED MYOCARDIAL INFARCTION) (H): ICD-10-CM

## 2018-05-23 ENCOUNTER — OFFICE VISIT (OUTPATIENT)
Dept: FAMILY MEDICINE | Facility: CLINIC | Age: 33
End: 2018-05-23
Payer: COMMERCIAL

## 2018-05-23 VITALS
HEART RATE: 79 BPM | TEMPERATURE: 98.6 F | WEIGHT: 144.2 LBS | SYSTOLIC BLOOD PRESSURE: 180 MMHG | OXYGEN SATURATION: 100 % | HEIGHT: 61 IN | BODY MASS INDEX: 27.23 KG/M2 | DIASTOLIC BLOOD PRESSURE: 110 MMHG

## 2018-05-23 DIAGNOSIS — M54.50 CHRONIC BILATERAL LOW BACK PAIN WITHOUT SCIATICA: ICD-10-CM

## 2018-05-23 DIAGNOSIS — K08.89 PAIN, DENTAL: ICD-10-CM

## 2018-05-23 DIAGNOSIS — I21.4 NSTEMI (NON-ST ELEVATED MYOCARDIAL INFARCTION) (H): Primary | ICD-10-CM

## 2018-05-23 DIAGNOSIS — Z30.42 ENCOUNTER FOR SURVEILLANCE OF INJECTABLE CONTRACEPTIVE: ICD-10-CM

## 2018-05-23 DIAGNOSIS — G89.29 CHRONIC BILATERAL LOW BACK PAIN WITHOUT SCIATICA: ICD-10-CM

## 2018-05-23 RX ORDER — ASPIRIN 81 MG/1
81 TABLET, CHEWABLE ORAL DAILY
Qty: 90 TABLET | Refills: 3 | Status: SHIPPED | OUTPATIENT
Start: 2018-05-23

## 2018-05-23 RX ORDER — MEDROXYPROGESTERONE ACETATE 150 MG/ML
150 INJECTION, SUSPENSION INTRAMUSCULAR
Qty: 1 ML | Refills: 0 | OUTPATIENT
Start: 2018-05-23 | End: 2018-05-23

## 2018-05-23 NOTE — LETTER
RETURN TO WORK/SCHOOL FORM    5/23/2018    Re: Jennifer Barraza  1985      To Whom It May Concern:     Jennifer Barraza was seen in clinic today..  She may not return to work until re-evaluated.  This is due to significant pain and hypertension.       Restrictions:  unable to work      Navi Hall MD  5/23/2018 3:39 PM

## 2018-05-23 NOTE — LETTER
May 23, 2018      Jennifer Barraza  [address]  [place] [address]        To whom it may concern,    Jennifer Barraza is looking to move to a warmer climate such as [place]. She has many medical conditions, and has spoke to a few different clinical physicians in [place], [place]. They believe they have some specialized treatments for her chronic medical conditions that they would like to offer her, but she would need to move there. The warmer climate would also provide some relief for Jennifer, given her chronic conditions. Please consider aiding her in setting up housing in [place] so she can continue pursuing new medical care.     Sincerely,    Navi Hall MD

## 2018-05-23 NOTE — NURSING NOTE
I administered the following to Jennifer Barraza.    MEDICATION: Medroxyprogesterone 150 mg  ROUTE: IM  SITE: RUQ - Gluteus  DOSE: 1 ML  LOT #: [id number]  :  Sari  EXPIRATION DATE:  09/31/2019  NDC#: 3955-6893-05     Pt was given pink slip and instructed to RTC 8/8/2018 to 8/29/2018    Was entire vial of medication used? Yes    Name of provider who requested the injection: Dr. Hall  Name of provider on site (faculty or community preceptor) at the time of performing the injection: Dr. Manuel LEBRON, [facility]

## 2018-05-23 NOTE — LETTER
RETURN TO WORK/SCHOOL FORM    5/23/2018    Re: Jennifer Barraza  1985      To Whom It May Concern:     Jennifer Barraza was seen in clinic today..  She may return to work with restrictions on 5/24/18          Restrictions:  Unable to antonio Hall MD  5/23/2018 3:38 PM

## 2018-05-23 NOTE — MR AVS SNAPSHOT
"              After Visit Summary   2018    Jennifer Barraza    MRN: [id number]           Patient Information     Date Of Birth          1985        Visit Information        Provider Department      2018 2:40 PM Navi Hall MD [facility]        Today's Diagnoses     NSTEMI (non-ST elevated myocardial infarction) (H)    -  1    Encounter for surveillance of injectable contraceptive        Pain, dental        Chronic bilateral low back pain without sciatica           Follow-ups after your visit        Follow-up notes from your care team     Return in about 2 weeks (around 2018) for BP Recheck.      Who to contact     Please call your clinic at [phone] to:    Ask questions about your health    Make or cancel appointments    Discuss your medicines    Learn about your test results    Speak to your doctor            Additional Information About Your Visit        MyChart Information     [employer] is an electronic gateway that provides easy, online access to your medical records. With [employer], you can request a clinic appointment, read your test results, renew a prescription or communicate with your care team.     To sign up for [employer] visit the website at [email or web address]   You will be asked to enter the access code listed below, as well as some personal information. Please follow the directions to create your username and password.     Your access code is: [email or web address]  Expires: 2018  2:59 PM     Your access code will  in 90 days. If you need help or a new code, please contact your [facility] or call [phone] for assistance.        Care EveryWhere ID     This is your Care EveryWhere ID. This could be used by other organizations to access your [place] medical records  [id number]        Your Vitals Were     Pulse Temperature Height Pulse Oximetry BMI (Body Mass Index)       79 98.6  F (37  C) (Oral) 5' 1\" (154.9 " cm) 100% 27.25 kg/m2        Blood Pressure from Last 3 Encounters:   05/23/18 (!) 180/110   04/11/18 118/86   04/05/18 120/78    Weight from Last 3 Encounters:   05/23/18 144 lb 3.2 oz (65.4 kg)   04/11/18 137 lb (62.1 kg)   04/05/18 139 lb (63 kg)              We Performed the Following     INJECTION INTRAMUSCULAR OR SUB-Q     medroxyPROGESTERone (DEPO-PROVERA) injection 150 mg (Charge)          Today's Medication Changes          These changes are accurate as of 5/23/18 11:59 PM.  If you have any questions, ask your nurse or doctor.               Start taking these medicines.        Dose/Directions    medroxyPROGESTERone 150 MG/ML injection   Commonly known as:  DEPO-PROVERA   Used for:  Encounter for surveillance of injectable contraceptive   Started by:  Navi Hall MD        Dose:  150 mg   Start taking on:  8/8/2018   Inject 1 mL (150 mg) into the muscle every 3 months for 21 days   Quantity:  1 mL   Refills:  0         These medicines have changed or have updated prescriptions.        Dose/Directions    aspirin 81 MG chewable tablet   This may have changed:    - how to take this  - when to take this   Used for:  NSTEMI (non-ST elevated myocardial infarction) (H)   Changed by:  Navi Hall MD        Dose:  81 mg   Take 1 tablet (81 mg) by mouth daily   Quantity:  90 tablet   Refills:  3            Where to get your medicines      [id number]  [address] [place] [address] AT [address] [place] [address]     Phone:  [phone]     aspirin 81 MG chewable tablet         Some of these will need a paper prescription and others can be bought over the counter.  Ask your nurse if you have questions.     You don't need a prescription for these medications     medroxyPROGESTERone 150 MG/ML injection                Primary Care Provider Office Phone # Fax #    Navi Hall [phone]  [phone]       [facility] [address] [place] [address]        Equal Access to Services     ELISEO MELENDEZ : Hadii aad ku hadsusyo Sodemianali, waaxda luqadaha, qaybta kaalmada jonnathanda, toyin rosemariein hayaaadithya santiagonoa jodibuddy boo crespo. So [facility] [phone].    ATENCIÓN: Si habla español, tiene a hernandez disposición servicios gratuitos de asistencia lingüística. Ehsan al [phone].    We comply with applicable federal civil rights laws and [place] laws. We do not discriminate on the basis of race, color, national origin, age, disability, sex, sexual orientation, or gender identity.            Thank you!     Thank you for choosing [facility]  for your care. Our goal is always to provide you with excellent care. Hearing back from our patients is one way we can continue to improve our services. Please take a few minutes to complete the written survey that you may receive in the mail after your visit with us. Thank you!             Your Updated Medication List - Protect others around you: Learn how to safely use, store and throw away your medicines at www.disposemymeds.org.          This list is accurate as of 5/23/18 11:59 PM.  Always use your most recent med list.                   Brand Name Dispense Instructions for use Diagnosis    albuterol 108 (90 Base) MCG/ACT Inhaler    PROAIR HFA    18 Inhaler    1-2 puffs Every 4-6 hours as needed for shortness of breath. Max 12 puffs/ day.    Moderate persistent asthma without complication       amLODIPine 10 MG tablet    NORVASC    30 tablet    Take 1 tablet (10 mg) by mouth daily    Moderate persistent asthma without complication       aspirin 81 MG chewable tablet     90 tablet    Take 1 tablet (81 mg) by mouth daily    NSTEMI (non-ST elevated myocardial infarction) (H)       atorvastatin 20 MG tablet    LIPITOR     Take 20 mg by mouth        carvedilol 25 MG tablet    COREG    60 tablet    Take 1 tablet (25 mg) by mouth 2 times daily    Acute  diastolic congestive heart failure (H)       clopidogrel 75 MG tablet    PLAVIX     Take 75 mg by mouth        furosemide 40 MG tablet    LASIX    30 tablet    Take 1 tablet (40 mg) by mouth daily    Acute diastolic congestive heart failure (H)       ipratropium - albuterol 0.5 mg/2.5 mg/3 mL 0.5-2.5 (3) MG/3ML neb solution    DUONEB    30 vial    Take 1 vial (3 mLs) by nebulization every 6 hours as needed    Persistent asthma with undetermined severity       lisinopril 40 MG tablet    PRINIVIL/ZESTRIL    30 tablet    Take 1 tablet (40 mg) by mouth daily    Essential hypertension with goal blood pressure less than 140/90       medroxyPROGESTERone 150 MG/ML injection   Start taking on:  8/8/2018    DEPO-PROVERA    1 mL    Inject 1 mL (150 mg) into the muscle every 3 months for 21 days    Encounter for surveillance of injectable contraceptive       mometasone-formoterol 200-5 MCG/ACT oral inhaler    DULERA    13 Inhaler    Inhale 2 puffs into the lungs 2 times daily    Moderate persistent asthma without complication       nitroGLYcerin 0.4 MG sublingual tablet    NITROSTAT     Place 0.4 mg under the tongue        omeprazole 20 MG CR capsule    priLOSEC    30 capsule    Take 1 capsule (20 mg) by mouth every morning (before breakfast)    Gastroesophageal reflux disease, esophagitis presence not specified       order for DME     1 each    Please check your blood pressure daily    Essential hypertension, benign       pantoprazole 20 MG EC tablet    PROTONIX    30 tablet    Take 20mg (1 tablet) by mouth 30-60 minutes before a meal.    Gastroesophageal reflux disease, esophagitis presence not specified       spironolactone 50 MG tablet    ALDACTONE    30 tablet    Take 1 tablet (50 mg) by mouth daily    Acute diastolic congestive heart failure (H), Essential hypertension, benign

## 2018-05-23 NOTE — PROGRESS NOTES
"       Subjective       Jennifer Ammy Barraza is a 33 year old  female with a complex PMH who presents for    Chief Complaint   Patient presents with     Pain     tooth pain dentist will not do surgery do to being on plavix     Hypertension     follow-up     Back Pain     and legs       Low back Pain  - started about 1 month ago, waxes and wanes  - 1000mg TID tylenol,   - heating helps  - Denies pain radiating down leg, numbness or tingling in groin, or incontinence  - No weight loss    Dental Pain  - Needs root canal, but cannot afford it   - Extraction cannot be offered due to aspirin and plavix  - \"Nerve exposed\" is what's causing pain  - Icing helps  - Given antibiotics by dentist    Hypertension s/p NSTEMI  - Still taking all her BP medications, but BP is elevated today  - Taking aspirin and plavix  - Went to cardiac rehab once, cannot make it frequently due to transportation issues    Pertinent ROS: Constitutional, HEENT, cardiovascular, pulmonary, gi and gu systems are negative, except as otherwise noted.    Social History:   History   Smoking Status     Current Every Day Smoker     Packs/day: 0.50     Types: Cigarettes   Smokeless Tobacco     Never Used     Comment: About 4 cigarettes a day, but patient is trying to quit.        Current Medications:   Current Outpatient Prescriptions   Medication Sig Dispense Refill     albuterol (PROAIR HFA) 108 (90 BASE) MCG/ACT Inhaler 1-2 puffs Every 4-6 hours as needed for shortness of breath. Max 12 puffs/ day. 18 Inhaler 0     amLODIPine (NORVASC) 10 MG tablet Take 1 tablet (10 mg) by mouth daily 30 tablet 0     aspirin 81 MG chewable tablet 81 mg       atorvastatin (LIPITOR) 20 MG tablet Take 20 mg by mouth       carvedilol (COREG) 25 MG tablet Take 1 tablet (25 mg) by mouth 2 times daily 60 tablet 11     clopidogrel (PLAVIX) 75 MG tablet Take 75 mg by mouth       furosemide (LASIX) 40 MG tablet Take 1 tablet (40 mg) by mouth daily 30 tablet 11     ipratropium - " "albuterol 0.5 mg/2.5 mg/3 mL (DUONEB) 0.5-2.5 (3) MG/3ML neb solution Take 1 vial (3 mLs) by nebulization every 6 hours as needed 30 vial 3     lisinopril (PRINIVIL/ZESTRIL) 40 MG tablet Take 1 tablet (40 mg) by mouth daily 30 tablet 0     mometasone-formoterol (DULERA) 200-5 MCG/ACT oral inhaler Inhale 2 puffs into the lungs 2 times daily 13 Inhaler 11     nitroGLYcerin (NITROSTAT) 0.4 MG sublingual tablet Place 0.4 mg under the tongue       omeprazole (PRILOSEC) 20 MG CR capsule Take 1 capsule (20 mg) by mouth every morning (before breakfast) 30 capsule 3     order for DME Please check your blood pressure daily 1 each 0     spironolactone (ALDACTONE) 50 MG tablet Take 1 tablet (50 mg) by mouth daily 30 tablet 11     medroxyPROGESTERone (DEPO-PROVERA) 150 MG/ML injection Inject 1 mL (150 mg) into the muscle every 3 months for 22 days 1 mL 0     pantoprazole (PROTONIX) 20 MG EC tablet Take 20mg (1 tablet) by mouth 30-60 minutes before a meal. (Patient not taking: Reported on 5/23/2018) 30 tablet 3            Objective     Vitals:    05/23/18 1459 05/23/18 1555   BP: (!) 207/126 (!) 180/110   Pulse: 79    Temp: 98.6  F (37  C)    TempSrc: Oral    SpO2: 100%    Weight: 144 lb 3.2 oz (65.4 kg)    Height: 5' 1\" (154.9 cm)      Body mass index is 27.25 kg/(m^2).    GENERAL: healthy, alert and no distress  EYES: Eyes grossly normal to inspection, extraocular movements - intact  HENT: significant molar caries  NECK: no tenderness, no adenopathy  RESP: lungs clear to auscultation - no rales, no rhonchi, no wheezes  CV: regular rates and rhythm, normal S1 S2, no S3 or S4 and no murmur, no click or rub -  ABDOMEN: soft, no tenderness, no  hepatosplenomegaly, no masses, normal bowel sounds  MS: bilateral paraspinal muscle tenderness to palpation,   SKIN: no suspicious lesions, no rashes         Assessment/Plan       (I21.4) NSTEMI (non-ST elevated myocardial infarction) (H)  (primary encounter diagnosis)  Comment:   Plan: " "aspirin 81 MG chewable tablet        BP is poorly controlled today - leonardo in comparison to previous visits. I suspect this has some component of pain related to it. Patient reports adherence to BP medications, but cannot confirm this. Discussed obtaining lab work to look for end organ damage - Jennifer declines this. Encouraged her to continue with cardiac rehab, and to work on obtaining rides. RTC in 2 weeks for BP check. Discussed smoking cessation - doesn't want nicotine replacement therapy prescribed.    (Z30.42) Encounter for surveillance of injectable contraceptive  Comment:   Plan: Administered depo-provera today    (K08.89) Pain, dental  Comment:   Plan: I discussed trying to create a payment plan with a dental clinic, even discussed [facility] as location to seek opportunity for procedure. Jennifer stated she already looked at the [facility] dental school, states it's not an option. Also states a dentist said she would \"bleed out\" if she had the procedure done while on plavix and aspirin. Doesn't want any further aid in setting up appt with dental office. Offered lidocaine gel to numb tooth - Jennifer stated she tried a numbing mouthwash that was ineffective. Continue icing and tylenol.    (M54.5,  G89.29) Chronic bilateral low back pain without sciatica  Comment:   Plan: Continue heat and tylenol. Encouraged continued ambulation and exercise. Jennifer has low back exercises from previous visits to PT she can refer to. No red flag signs.    Options for treatment and follow-up care were reviewed with the patient and/or guardian. Jennifer Soise White and/or guardian engaged in the decision making process and verbalized understanding of the options discussed and agreed with the final plan.    Navi Hall MD      Precepted today with: Dr. Lino Jackson    This note was created using Dragon Dictation software. Any grammatical errors or word substitutions are unintentional, despite proofreading.  "

## 2018-05-24 RX ORDER — MEDROXYPROGESTERONE ACETATE 150 MG/ML
150 INJECTION, SUSPENSION INTRAMUSCULAR
Qty: 1 ML | Refills: 0 | OUTPATIENT
Start: 2018-08-08 | End: 2018-08-29

## 2018-05-29 NOTE — PROGRESS NOTES
Preceptor Attestation:   Patient seen, evaluated and discussed with the resident. I have verified the content of the note, which accurately reflects my assessment of the patient and the plan of care.    Supervising Physician:Efren Jackson MD    [facility]

## 2018-06-20 DIAGNOSIS — I21.4 NSTEMI (NON-ST ELEVATED MYOCARDIAL INFARCTION) (H): Primary | ICD-10-CM

## 2018-06-21 RX ORDER — CLOPIDOGREL BISULFATE 75 MG/1
75 TABLET ORAL DAILY
Qty: 90 TABLET | Refills: 3 | Status: SHIPPED | OUTPATIENT
Start: 2018-06-21

## 2018-07-17 ENCOUNTER — TRANSFERRED RECORDS (OUTPATIENT)
Dept: HEALTH INFORMATION MANAGEMENT | Facility: CLINIC | Age: 33
End: 2018-07-17

## 2018-07-30 ENCOUNTER — TELEPHONE (OUTPATIENT)
Dept: FAMILY MEDICINE | Facility: CLINIC | Age: 33
End: 2018-07-30

## 2018-07-30 NOTE — TELEPHONE ENCOUNTER
Received notification from pharmacy/insurance that Omeprazole will not be covered.     As general rule, insurance company will cover #90 quantity of any PPI per 365 days. Use beyond this amount requires prior authorization.     Clinic policy is to pursue prior authorization for the following disease states:  1) Lewis's esophagus  2) Erosive esophagitis  3) Peptic ulcer disease (within recommended treatment duration)  4) H pylori eradication    If PPI is used for diagnosis not listed above, PA will not be pursued only if directed by provider. If not pursued, patient will be instructed to purchase out of pocket.     Plan: Route to PCP for instruction on next step    Parisa Ruiz July 30, 2018 at 9:55 AM

## 2018-08-20 ENCOUNTER — TELEPHONE (OUTPATIENT)
Dept: FAMILY MEDICINE | Facility: CLINIC | Age: 33
End: 2018-08-20

## 2018-08-28 ENCOUNTER — TELEPHONE (OUTPATIENT)
Dept: FAMILY MEDICINE | Facility: CLINIC | Age: 33
End: 2018-08-28

## 2018-08-28 NOTE — TELEPHONE ENCOUNTER
Patient returning Cony's call, she was not available so gave her message below. Patient states she was not aware that Dr. Hall was going to be leaving so she had her dental clinic fax forms for him to fill out and got them back but it was filled out incorrectly so she states she won't be coming back to our clinic now that Dr. Hall is gone since they have agreed apon this dental work and he knew about it. She states she will be going to a clinic closer to home instead.

## 2018-12-01 ENCOUNTER — PRIOR ORIGINAL RECORDS (OUTPATIENT)
Dept: HEALTH INFORMATION MANAGEMENT | Facility: OTHER | Age: 33
End: 2018-12-01

## 2019-04-12 ENCOUNTER — HOSPITAL (OUTPATIENT)
Dept: OTHER | Age: 34
End: 2019-04-12
Attending: INTERNAL MEDICINE

## 2019-04-12 LAB
A/G RATIO_: 1.1
ABS LYMPH: 1.8 K/CUMM (ref 1–3.5)
ABS MONO: 0.6 K/CUMM (ref 0.1–0.8)
ABS NEUTRO: 3.3 K/CUMM (ref 2–8)
ALBUMIN: 3.3 G/DL (ref 3.5–5)
ALK PHOS: 48 UNIT/L (ref 50–124)
ALT/GPT: 17 UNIT/L (ref 0–55)
ANION GAP SERPL CALC-SCNC: 14 MEQ/L (ref 10–20)
AST/GOT: 17 UNIT/L (ref 5–34)
BASOPHIL: 1 % (ref 0–1)
BILI TOTAL: 1.2 MG/DL (ref 0.2–1)
BNP: 3169 PG/ML (ref 1–100)
BUN SERPL-MCNC: 17 MG/DL (ref 6–20)
CALCIUM: 8.6 MG/DL (ref 8.4–10.2)
CHLORIDE: 106 MEQ/L (ref 97–107)
CONTROL LINE: PRESENT
CREATININE: 0.73 MG/DL (ref 0.6–1.3)
DIFF_TYPE?: ABNORMAL
EOSINOPHIL: 0 % (ref 0–6)
GLOBULIN_: 3.1 G/DL (ref 2–4.1)
GLUCOSE LVL: 86 MG/DL (ref 70–99)
HCT VFR BLD CALC: 35 % (ref 33–45)
HEMOLYSIS 2+: NEGATIVE
HEMOLYSIS 4+: ABNORMAL
HEMOLYSIS 4+: NEGATIVE
HGB BLD-MCNC: 10.4 G/DL (ref 11–15)
ICTERIC 4+: NEGATIVE
IMMATURE GRAN: 0.2 % (ref 0–0.3)
INFLUENZ A: NORMAL
INFLUENZ B: NORMAL
INFLUENZ COMMNT: NORMAL
INSTR WBC: 5.8 K/CUMM (ref 4–11)
LIPASE LEVEL: 11 UNIT/L (ref 8–78)
LIPEMIC 3+: NEGATIVE
LIPEMIC 4+: NEGATIVE
LYMPHOCYTE: 32 %
MCH RBC QN AUTO: 25 PG (ref 25–35)
MCHC RBC AUTO-ENTMCNC: 30 G/DL (ref 32–37)
MCV RBC AUTO: 85 FL (ref 78–97)
MONOCYTE: 11 %
NEUTROPHIL: 56 %
NRBC BLD MANUAL-RTO: 0 % (ref 0–0.2)
PLATELET: 258 K/CUMM (ref 150–450)
PLT ESTIMATE: ADEQUATE
POTASSIUM: 4 MEQ/L (ref 3.5–5.1)
RBC # BLD: 4.14 M/CUMM (ref 3.7–5.2)
RBC MORPH2: ABNORMAL
RBC MORPH3: ABNORMAL
RBC MORPH4: ABNORMAL
RBC MORPH: ABNORMAL
RDW: 20.3 % (ref 11.5–14.5)
SER HCG INTERP: NEGATIVE
SERUM HCG: <1 MIU/ML
SODIUM: 136 MEQ/L (ref 136–145)
TCO2: 20 MEQ/L (ref 19–29)
TOTAL PROTEIN: 6.4 G/DL (ref 6.4–8.3)
TROPONIN I: 0.01 NG/ML
TROPONIN I: <0.01 NG/ML
WBC # BLD: 5.8 K/CUMM (ref 4–11)

## 2019-04-12 PROCEDURE — 93010 ELECTROCARDIOGRAM REPORT: CPT | Performed by: INTERNAL MEDICINE

## 2019-04-13 LAB
A/G RATIO_: 1
ABS LYMPH: 1.4 K/CUMM (ref 1–3.5)
ABS MONO: 0.7 K/CUMM (ref 0.1–0.8)
ABS NEUTRO: 3.3 K/CUMM (ref 2–8)
ALBUMIN: 3 G/DL (ref 3.5–5)
ALK PHOS: 49 UNIT/L (ref 50–124)
ALT/GPT: 16 UNIT/L (ref 0–55)
ANION GAP SERPL CALC-SCNC: 10 MEQ/L (ref 10–20)
AST/GOT: 15 UNIT/L (ref 5–34)
BASOPHIL: 0 % (ref 0–1)
BILI TOTAL: 0.7 MG/DL (ref 0.2–1)
BUN SERPL-MCNC: 17 MG/DL (ref 6–20)
CALCIUM: 8.5 MG/DL (ref 8.4–10.2)
CHLORIDE: 105 MEQ/L (ref 97–107)
CHOLESTEROL: 128 MG/DL
CREATININE: 0.89 MG/DL (ref 0.6–1.3)
DIFF_TYPE?: ABNORMAL
EOSINOPHIL: 1 % (ref 0–6)
GLOBULIN_: 3.1 G/DL (ref 2–4.1)
GLUCOSE LVL: 89 MG/DL (ref 70–99)
HCT VFR BLD CALC: 31 % (ref 33–45)
HDLC SERPL-MCNC: 18 MG/DL (ref 40–60)
HEMOLYSIS 2+: NEGATIVE
HEMOLYSIS 2+: NEGATIVE
HGB BLD-MCNC: 9.3 G/DL (ref 11–15)
IMMATURE GRAN: 0.4 % (ref 0–0.3)
INSTR WBC: 5.6 K/CUMM (ref 4–11)
LDLC SERPL CALC-MCNC: 96 MG/DL
LIPEMIC 3+: NEGATIVE
LYMPHOCYTE: 26 %
MAGNESIUM LEVEL: 1.8 MG/DL (ref 1.6–2.6)
MCH RBC QN AUTO: 25 PG (ref 25–35)
MCHC RBC AUTO-ENTMCNC: 30 G/DL (ref 32–37)
MCV RBC AUTO: 83 FL (ref 78–97)
MONOCYTE: 12 %
NEUTROPHIL: 59 %
NRBC BLD MANUAL-RTO: 0 % (ref 0–0.2)
PLATELET: 226 K/CUMM (ref 150–450)
POTASSIUM: 3.5 MEQ/L (ref 3.5–5.1)
RBC # BLD: 3.7 M/CUMM (ref 3.7–5.2)
RDW: 19.9 % (ref 11.5–14.5)
SODIUM: 135 MEQ/L (ref 136–145)
TCO2: 24 MEQ/L (ref 19–29)
TOTAL PROTEIN: 6.1 G/DL (ref 6.4–8.3)
TRIGL SERPL-MCNC: 72 MG/DL
WBC # BLD: 5.6 K/CUMM (ref 4–11)

## 2019-04-14 LAB
ANION GAP SERPL CALC-SCNC: 12 MEQ/L (ref 10–20)
BUN SERPL-MCNC: 17 MG/DL (ref 6–20)
CALCIUM: 8.6 MG/DL (ref 8.4–10.2)
CHLORIDE: 105 MEQ/L (ref 97–107)
CREATININE: 0.79 MG/DL (ref 0.6–1.3)
GLUCOSE LVL: 103 MG/DL (ref 70–99)
HEMOLYSIS 2+: ABNORMAL
HEMOLYSIS 2+: NORMAL
MAGNESIUM LEVEL: 1.8 MG/DL (ref 1.6–2.6)
POTASSIUM: 3.9 MEQ/L (ref 3.5–5.1)
SODIUM: 138 MEQ/L (ref 136–145)
TCO2: 25 MEQ/L (ref 19–29)

## 2019-04-22 ENCOUNTER — HOSPITAL (OUTPATIENT)
Dept: OTHER | Age: 34
End: 2019-04-22
Attending: INTERNAL MEDICINE

## 2019-04-22 LAB
ANION GAP SERPL CALC-SCNC: 12 MEQ/L (ref 10–20)
BUN SERPL-MCNC: 10 MG/DL (ref 6–20)
CALCIUM: 9.1 MG/DL (ref 8.4–10.2)
CHLORIDE: 105 MEQ/L (ref 97–107)
CREATININE: 0.76 MG/DL (ref 0.6–1.3)
GLUCOSE LVL: 90 MG/DL (ref 70–99)
HEMOLYSIS 2+: NEGATIVE
POTASSIUM: 4.2 MEQ/L (ref 3.5–5.1)
SODIUM: 138 MEQ/L (ref 136–145)
TCO2: 25 MEQ/L (ref 19–29)

## 2019-05-29 ENCOUNTER — TELEPHONE (OUTPATIENT)
Dept: CARDIOLOGY | Age: 34
End: 2019-05-29

## 2021-05-29 ENCOUNTER — HEALTH MAINTENANCE LETTER (OUTPATIENT)
Age: 36
End: 2021-05-29

## 2021-05-31 VITALS — BODY MASS INDEX: 28.89 KG/M2 | WEIGHT: 153 LBS | HEIGHT: 61 IN

## 2021-06-01 VITALS — WEIGHT: 142 LBS | BODY MASS INDEX: 26.4 KG/M2

## 2021-06-17 NOTE — PROGRESS NOTES
"Cardiac Rehab  Phase II Assessment    Assessment Date: 5-10-18    Diagnosis: NSTEMI  Date of Onset: 3-20-18  Procedure: PCI with DONA to RCA  Date of Onset: 4-7-18  ICD/Pacemaker: No   ECG History: NSR-SR EF%:70-77%  Past Medical History:   Patient Active Problem List   Diagnosis     Asthma     Chest pain     Tobacco use disorder     Hypertension, benign     Low back pain     Lump or mass in breast     Major depressive disorder, recurrent episode     Mastodynia     Migraine     Moyamoya disease     Pain in joint, forearm     Pain in joint, multiple sites     Reflux esophagitis     Asthma exacerbation     Past Medical History:   Diagnosis Date     Asthma      Esophageal reflux      Hypertension      Joint pain      Low back pain      Lump or mass in breast      Major depression, recurrent      Mastodynia      MI (myocardial infarction) 5/2015     Migraines      Perez perez disease      Multiple joint pain      Seizure     post craniotomy per pt     Stroke     multiple strokes, none since 2013     Tobacco abuse      Past Surgical History:   Procedure Laterality Date     CRANIOTOMY  2012     FOOT SURGERY Right     benign tumor removed   \"CHF\" since 2012, Pulmonary HTN, Severe MR and Moderate MS,--Pt reports she will be having this evaluated on May 16th.    Physical Assessment  Precautions/ Physical Limitations: Seizure, Falls, Severe MR, Moderate MS  Oxygen: No  O2 Sats: 100% Lung Sounds: Clear Edema: none  Incisions: na  Sleeping Pattern: poor  Appetite: fair   Nutrition Risk Screen: no risk at this time- Enc to consult with the dietitian    Pain  Location: no cardiac sx's  Characteristics:Chronic low back pain, Joint pain(neck, arm,leg)  Intensity: (0-10 scale) 0    Psychosocial/ Emotional Health  1. In the past 12 months, have you been in a relationship where you have been abused physically, emotionally, sexually or financially? No  notified: NA  2. Who do you turn to for emotional support?: Sister  3. " "Do you have cultural or spiritual needs? No  4. Have there been any major life changes in the past 12 months? Yes - Heart issues, My nephew was \"killed\"     Referral Information  Primary Physician: Navi Hall MD  Cardiologist: I don't know his name, \"[facility]\"    Home exercise/Equipment: None    Patient's long-term goal(s): \"Get back to normal\"    1. Living Accommodations: Home Steps: No      Support people at home: \"Lots of family\"   2. Marital Status: SO  3. Family is able to assist with cares      M[employer]on/Community involvement: none  4. Recreation/Hobbies: Involved with family        See Doc Flowsheet  "

## 2021-06-18 NOTE — PROGRESS NOTES
Pt only completed one session of Cardiac Rehab. After 3 No shows/No calls pt was called and left message per procedure. No return call from pt. Will D/C chart on 6-12-18.

## 2021-09-18 ENCOUNTER — HEALTH MAINTENANCE LETTER (OUTPATIENT)
Age: 36
End: 2021-09-18

## 2022-06-25 ENCOUNTER — HEALTH MAINTENANCE LETTER (OUTPATIENT)
Age: 37
End: 2022-06-25

## 2022-11-20 ENCOUNTER — HEALTH MAINTENANCE LETTER (OUTPATIENT)
Age: 37
End: 2022-11-20

## 2023-07-02 ENCOUNTER — HEALTH MAINTENANCE LETTER (OUTPATIENT)
Age: 38
End: 2023-07-02

## 2024-06-16 ENCOUNTER — HEALTH MAINTENANCE LETTER (OUTPATIENT)
Age: 39
End: 2024-06-16